# Patient Record
Sex: FEMALE | Race: BLACK OR AFRICAN AMERICAN | NOT HISPANIC OR LATINO
[De-identification: names, ages, dates, MRNs, and addresses within clinical notes are randomized per-mention and may not be internally consistent; named-entity substitution may affect disease eponyms.]

---

## 2017-02-21 ENCOUNTER — APPOINTMENT (OUTPATIENT)
Dept: SPINE | Facility: CLINIC | Age: 62
End: 2017-02-21

## 2017-03-21 ENCOUNTER — APPOINTMENT (OUTPATIENT)
Dept: SPINE | Facility: CLINIC | Age: 62
End: 2017-03-21

## 2017-03-21 VITALS
DIASTOLIC BLOOD PRESSURE: 84 MMHG | WEIGHT: 198 LBS | HEIGHT: 62.5 IN | BODY MASS INDEX: 35.52 KG/M2 | SYSTOLIC BLOOD PRESSURE: 126 MMHG

## 2017-03-21 DIAGNOSIS — Z80.9 FAMILY HISTORY OF MALIGNANT NEOPLASM, UNSPECIFIED: ICD-10-CM

## 2017-03-21 DIAGNOSIS — Z86.39 PERSONAL HISTORY OF OTHER ENDOCRINE, NUTRITIONAL AND METABOLIC DISEASE: ICD-10-CM

## 2017-03-21 DIAGNOSIS — Z80.1 FAMILY HISTORY OF MALIGNANT NEOPLASM OF TRACHEA, BRONCHUS AND LUNG: ICD-10-CM

## 2017-03-21 DIAGNOSIS — Z86.79 PERSONAL HISTORY OF OTHER DISEASES OF THE CIRCULATORY SYSTEM: ICD-10-CM

## 2017-03-21 DIAGNOSIS — Z85.9 PERSONAL HISTORY OF MALIGNANT NEOPLASM, UNSPECIFIED: ICD-10-CM

## 2017-03-21 RX ORDER — METOPROLOL SUCCINATE 50 MG/1
50 TABLET, EXTENDED RELEASE ORAL
Refills: 0 | Status: ACTIVE | COMMUNITY

## 2017-03-21 RX ORDER — METFORMIN HYDROCHLORIDE 625 MG/1
TABLET ORAL
Refills: 0 | Status: ACTIVE | COMMUNITY

## 2017-03-21 RX ORDER — MULTIVITAMIN
TABLET ORAL
Refills: 0 | Status: ACTIVE | COMMUNITY

## 2017-03-21 RX ORDER — ASPIRIN 81 MG
81 TABLET, DELAYED RELEASE (ENTERIC COATED) ORAL
Refills: 0 | Status: ACTIVE | COMMUNITY

## 2017-03-21 RX ORDER — AMLODIPINE BESYLATE 10 MG/1
10 TABLET ORAL
Refills: 0 | Status: ACTIVE | COMMUNITY

## 2017-08-31 ENCOUNTER — APPOINTMENT (OUTPATIENT)
Dept: SPINE | Facility: CLINIC | Age: 62
End: 2017-08-31
Payer: COMMERCIAL

## 2017-08-31 VITALS
HEIGHT: 62 IN | WEIGHT: 196 LBS | BODY MASS INDEX: 36.07 KG/M2 | DIASTOLIC BLOOD PRESSURE: 78 MMHG | SYSTOLIC BLOOD PRESSURE: 124 MMHG

## 2017-08-31 DIAGNOSIS — M48.06 SPINAL STENOSIS, LUMBAR REGION: ICD-10-CM

## 2017-08-31 PROCEDURE — 99213 OFFICE O/P EST LOW 20 MIN: CPT

## 2017-08-31 RX ORDER — FLUTICASONE PROPIONATE AND SALMETEROL 50; 250 UG/1; UG/1
250-50 POWDER RESPIRATORY (INHALATION)
Refills: 0 | Status: ACTIVE | COMMUNITY

## 2017-08-31 RX ORDER — ALBUTEROL SULFATE 90 UG/1
AEROSOL, METERED RESPIRATORY (INHALATION)
Refills: 0 | Status: ACTIVE | COMMUNITY

## 2017-10-06 ENCOUNTER — OUTPATIENT (OUTPATIENT)
Dept: OUTPATIENT SERVICES | Facility: HOSPITAL | Age: 62
LOS: 1 days | End: 2017-10-06
Payer: COMMERCIAL

## 2017-10-06 VITALS
SYSTOLIC BLOOD PRESSURE: 129 MMHG | WEIGHT: 192.02 LBS | HEART RATE: 59 BPM | RESPIRATION RATE: 18 BRPM | HEIGHT: 62 IN | OXYGEN SATURATION: 100 % | TEMPERATURE: 98 F | DIASTOLIC BLOOD PRESSURE: 72 MMHG

## 2017-10-06 DIAGNOSIS — E11.9 TYPE 2 DIABETES MELLITUS WITHOUT COMPLICATIONS: ICD-10-CM

## 2017-10-06 DIAGNOSIS — I25.10 ATHEROSCLEROTIC HEART DISEASE OF NATIVE CORONARY ARTERY WITHOUT ANGINA PECTORIS: ICD-10-CM

## 2017-10-06 DIAGNOSIS — Z01.818 ENCOUNTER FOR OTHER PREPROCEDURAL EXAMINATION: ICD-10-CM

## 2017-10-06 DIAGNOSIS — Z98.890 OTHER SPECIFIED POSTPROCEDURAL STATES: Chronic | ICD-10-CM

## 2017-10-06 DIAGNOSIS — J45.30 MILD PERSISTENT ASTHMA, UNCOMPLICATED: ICD-10-CM

## 2017-10-06 DIAGNOSIS — Z86.79 PERSONAL HISTORY OF OTHER DISEASES OF THE CIRCULATORY SYSTEM: ICD-10-CM

## 2017-10-06 DIAGNOSIS — M48.00 SPINAL STENOSIS, SITE UNSPECIFIED: ICD-10-CM

## 2017-10-06 DIAGNOSIS — M48.061 SPINAL STENOSIS, LUMBAR REGION WITHOUT NEUROGENIC CLAUDICATION: ICD-10-CM

## 2017-10-06 DIAGNOSIS — Z90.49 ACQUIRED ABSENCE OF OTHER SPECIFIED PARTS OF DIGESTIVE TRACT: Chronic | ICD-10-CM

## 2017-10-06 LAB
ANION GAP SERPL CALC-SCNC: 15 MMOL/L — SIGNIFICANT CHANGE UP (ref 5–17)
BUN SERPL-MCNC: 14 MG/DL — SIGNIFICANT CHANGE UP (ref 7–23)
CALCIUM SERPL-MCNC: 9.5 MG/DL — SIGNIFICANT CHANGE UP (ref 8.4–10.5)
CHLORIDE SERPL-SCNC: 103 MMOL/L — SIGNIFICANT CHANGE UP (ref 96–108)
CO2 SERPL-SCNC: 22 MMOL/L — SIGNIFICANT CHANGE UP (ref 22–31)
CREAT SERPL-MCNC: 0.66 MG/DL — SIGNIFICANT CHANGE UP (ref 0.5–1.3)
GLUCOSE SERPL-MCNC: 178 MG/DL — HIGH (ref 70–99)
HBA1C BLD-MCNC: 10.4 % — HIGH (ref 4–5.6)
HCT VFR BLD CALC: 39.9 % — SIGNIFICANT CHANGE UP (ref 34.5–45)
HGB BLD-MCNC: 13.6 G/DL — SIGNIFICANT CHANGE UP (ref 11.5–15.5)
MCHC RBC-ENTMCNC: 29.8 PG — SIGNIFICANT CHANGE UP (ref 27–34)
MCHC RBC-ENTMCNC: 34.1 GM/DL — SIGNIFICANT CHANGE UP (ref 32–36)
MCV RBC AUTO: 87.5 FL — SIGNIFICANT CHANGE UP (ref 80–100)
PLATELET # BLD AUTO: 317 K/UL — SIGNIFICANT CHANGE UP (ref 150–400)
POTASSIUM SERPL-MCNC: 4.4 MMOL/L — SIGNIFICANT CHANGE UP (ref 3.5–5.3)
POTASSIUM SERPL-SCNC: 4.4 MMOL/L — SIGNIFICANT CHANGE UP (ref 3.5–5.3)
RBC # BLD: 4.56 M/UL — SIGNIFICANT CHANGE UP (ref 3.8–5.2)
RBC # FLD: 12.3 % — SIGNIFICANT CHANGE UP (ref 10.3–14.5)
SODIUM SERPL-SCNC: 140 MMOL/L — SIGNIFICANT CHANGE UP (ref 135–145)
WBC # BLD: 7.47 K/UL — SIGNIFICANT CHANGE UP (ref 3.8–10.5)
WBC # FLD AUTO: 7.47 K/UL — SIGNIFICANT CHANGE UP (ref 3.8–10.5)

## 2017-10-06 PROCEDURE — 83036 HEMOGLOBIN GLYCOSYLATED A1C: CPT

## 2017-10-06 PROCEDURE — 93005 ELECTROCARDIOGRAM TRACING: CPT

## 2017-10-06 PROCEDURE — 80048 BASIC METABOLIC PNL TOTAL CA: CPT

## 2017-10-06 PROCEDURE — 93010 ELECTROCARDIOGRAM REPORT: CPT

## 2017-10-06 PROCEDURE — 85027 COMPLETE CBC AUTOMATED: CPT

## 2017-10-06 PROCEDURE — G0463: CPT

## 2017-10-06 NOTE — H&P PST ADULT - PROBLEM SELECTOR PLAN 2
will continue antihypertensive medication imelda-op  last echo 4/2017 to be requested  scheduled to be evaluated by her PCP prior to surgery

## 2017-10-06 NOTE — H&P PST ADULT - HISTORY OF PRESENT ILLNESS
61 year old female with h/o previous MI s/p PCI with stenting (2000), HTN, T2DM, Dyslipidemia, mild intermittent asthma, iron deficiency anemia, scheduled for L4-S1 decompressive lumbar laminectomy for spinal stenosis.

## 2017-10-13 ENCOUNTER — APPOINTMENT (OUTPATIENT)
Dept: SPINE | Facility: HOSPITAL | Age: 62
End: 2017-10-13

## 2017-11-10 ENCOUNTER — OUTPATIENT (OUTPATIENT)
Dept: OUTPATIENT SERVICES | Facility: HOSPITAL | Age: 62
LOS: 1 days | End: 2017-11-10
Payer: COMMERCIAL

## 2017-11-10 VITALS
TEMPERATURE: 98 F | DIASTOLIC BLOOD PRESSURE: 84 MMHG | OXYGEN SATURATION: 97 % | HEIGHT: 62.5 IN | WEIGHT: 194.01 LBS | HEART RATE: 58 BPM | SYSTOLIC BLOOD PRESSURE: 146 MMHG | RESPIRATION RATE: 16 BRPM

## 2017-11-10 DIAGNOSIS — J45.30 MILD PERSISTENT ASTHMA, UNCOMPLICATED: ICD-10-CM

## 2017-11-10 DIAGNOSIS — Z90.49 ACQUIRED ABSENCE OF OTHER SPECIFIED PARTS OF DIGESTIVE TRACT: Chronic | ICD-10-CM

## 2017-11-10 DIAGNOSIS — I25.10 ATHEROSCLEROTIC HEART DISEASE OF NATIVE CORONARY ARTERY WITHOUT ANGINA PECTORIS: ICD-10-CM

## 2017-11-10 DIAGNOSIS — M48.061 SPINAL STENOSIS, LUMBAR REGION WITHOUT NEUROGENIC CLAUDICATION: ICD-10-CM

## 2017-11-10 DIAGNOSIS — E78.5 HYPERLIPIDEMIA, UNSPECIFIED: ICD-10-CM

## 2017-11-10 DIAGNOSIS — E11.9 TYPE 2 DIABETES MELLITUS WITHOUT COMPLICATIONS: ICD-10-CM

## 2017-11-10 DIAGNOSIS — Z98.890 OTHER SPECIFIED POSTPROCEDURAL STATES: Chronic | ICD-10-CM

## 2017-11-10 DIAGNOSIS — M48.00 SPINAL STENOSIS, SITE UNSPECIFIED: ICD-10-CM

## 2017-11-10 DIAGNOSIS — Z01.818 ENCOUNTER FOR OTHER PREPROCEDURAL EXAMINATION: ICD-10-CM

## 2017-11-10 DIAGNOSIS — Z86.79 PERSONAL HISTORY OF OTHER DISEASES OF THE CIRCULATORY SYSTEM: ICD-10-CM

## 2017-11-10 LAB
BLD GP AB SCN SERPL QL: NEGATIVE — SIGNIFICANT CHANGE UP
HBA1C BLD-MCNC: 8.1 % — HIGH (ref 4–5.6)
RH IG SCN BLD-IMP: POSITIVE — SIGNIFICANT CHANGE UP

## 2017-11-10 PROCEDURE — 86900 BLOOD TYPING SEROLOGIC ABO: CPT

## 2017-11-10 PROCEDURE — 83036 HEMOGLOBIN GLYCOSYLATED A1C: CPT

## 2017-11-10 PROCEDURE — G0463: CPT

## 2017-11-10 PROCEDURE — 86901 BLOOD TYPING SEROLOGIC RH(D): CPT

## 2017-11-10 PROCEDURE — 86850 RBC ANTIBODY SCREEN: CPT

## 2017-11-10 NOTE — H&P PST ADULT - HISTORY OF PRESENT ILLNESS
61 year old female with h/o previous MI s/p PCI with stenting (2000), HTN, T2DM, Dyslipidemia, mild intermittent asthma, iron deficiency anemia, scheduled for L4-S1 decompressive lumbar laminectomy for spinal stenosis. 61 year old female with h/o previous MI s/p PCI with stenting (2000), HTN, T2DM, Dyslipidemia, mild intermittent asthma ( last exacerbation May 2017) , iron deficiency anemia, scheduled for L4-S1 decompressive lumbar laminectomy for spinal stenosis.  Surgery was cancelled because pt needed a stress test; Pt has had stress test done and went to see PCP 11/7/2017

## 2017-11-17 ENCOUNTER — TRANSCRIPTION ENCOUNTER (OUTPATIENT)
Age: 62
End: 2017-11-17

## 2017-11-17 ENCOUNTER — APPOINTMENT (OUTPATIENT)
Dept: SPINE | Facility: HOSPITAL | Age: 62
End: 2017-11-17

## 2017-11-17 ENCOUNTER — INPATIENT (INPATIENT)
Facility: HOSPITAL | Age: 62
LOS: 3 days | Discharge: HOME | DRG: 516 | End: 2017-11-21
Attending: NEUROLOGICAL SURGERY | Admitting: NEUROLOGICAL SURGERY
Payer: COMMERCIAL

## 2017-11-17 VITALS
RESPIRATION RATE: 17 BRPM | DIASTOLIC BLOOD PRESSURE: 81 MMHG | HEIGHT: 62.5 IN | TEMPERATURE: 98 F | HEART RATE: 66 BPM | SYSTOLIC BLOOD PRESSURE: 146 MMHG | OXYGEN SATURATION: 98 % | WEIGHT: 194.01 LBS

## 2017-11-17 DIAGNOSIS — Z98.890 OTHER SPECIFIED POSTPROCEDURAL STATES: Chronic | ICD-10-CM

## 2017-11-17 DIAGNOSIS — Z90.49 ACQUIRED ABSENCE OF OTHER SPECIFIED PARTS OF DIGESTIVE TRACT: Chronic | ICD-10-CM

## 2017-11-17 DIAGNOSIS — M48.061 SPINAL STENOSIS, LUMBAR REGION WITHOUT NEUROGENIC CLAUDICATION: ICD-10-CM

## 2017-11-17 LAB
ANION GAP SERPL CALC-SCNC: 14 MMOL/L — SIGNIFICANT CHANGE UP (ref 5–17)
BASOPHILS # BLD AUTO: 0 K/UL — SIGNIFICANT CHANGE UP (ref 0–0.2)
BASOPHILS NFR BLD AUTO: 0 % — SIGNIFICANT CHANGE UP (ref 0–2)
BUN SERPL-MCNC: 11 MG/DL — SIGNIFICANT CHANGE UP (ref 7–23)
CALCIUM SERPL-MCNC: 7.9 MG/DL — LOW (ref 8.4–10.5)
CHLORIDE SERPL-SCNC: 106 MMOL/L — SIGNIFICANT CHANGE UP (ref 96–108)
CO2 SERPL-SCNC: 22 MMOL/L — SIGNIFICANT CHANGE UP (ref 22–31)
CREAT SERPL-MCNC: 0.53 MG/DL — SIGNIFICANT CHANGE UP (ref 0.5–1.3)
EOSINOPHIL # BLD AUTO: 0 K/UL — SIGNIFICANT CHANGE UP (ref 0–0.5)
EOSINOPHIL NFR BLD AUTO: 0.3 % — SIGNIFICANT CHANGE UP (ref 0–6)
GLUCOSE BLDC GLUCOMTR-MCNC: 205 MG/DL — HIGH (ref 70–99)
GLUCOSE BLDC GLUCOMTR-MCNC: 215 MG/DL — HIGH (ref 70–99)
GLUCOSE BLDC GLUCOMTR-MCNC: 85 MG/DL — SIGNIFICANT CHANGE UP (ref 70–99)
GLUCOSE SERPL-MCNC: 231 MG/DL — HIGH (ref 70–99)
HCT VFR BLD CALC: 40 % — SIGNIFICANT CHANGE UP (ref 34.5–45)
HGB BLD-MCNC: 14.2 G/DL — SIGNIFICANT CHANGE UP (ref 11.5–15.5)
LYMPHOCYTES # BLD AUTO: 1.2 K/UL — SIGNIFICANT CHANGE UP (ref 1–3.3)
LYMPHOCYTES # BLD AUTO: 14.1 % — SIGNIFICANT CHANGE UP (ref 13–44)
MCHC RBC-ENTMCNC: 32 PG — SIGNIFICANT CHANGE UP (ref 27–34)
MCHC RBC-ENTMCNC: 35.5 GM/DL — SIGNIFICANT CHANGE UP (ref 32–36)
MCV RBC AUTO: 90.3 FL — SIGNIFICANT CHANGE UP (ref 80–100)
MONOCYTES # BLD AUTO: 0.2 K/UL — SIGNIFICANT CHANGE UP (ref 0–0.9)
MONOCYTES NFR BLD AUTO: 2.1 % — SIGNIFICANT CHANGE UP (ref 2–14)
NEUTROPHILS # BLD AUTO: 6.9 K/UL — SIGNIFICANT CHANGE UP (ref 1.8–7.4)
NEUTROPHILS NFR BLD AUTO: 83.5 % — HIGH (ref 43–77)
PLATELET # BLD AUTO: 292 K/UL — SIGNIFICANT CHANGE UP (ref 150–400)
POTASSIUM SERPL-MCNC: 3.5 MMOL/L — SIGNIFICANT CHANGE UP (ref 3.5–5.3)
POTASSIUM SERPL-SCNC: 3.5 MMOL/L — SIGNIFICANT CHANGE UP (ref 3.5–5.3)
RBC # BLD: 4.44 M/UL — SIGNIFICANT CHANGE UP (ref 3.8–5.2)
RBC # FLD: 11.9 % — SIGNIFICANT CHANGE UP (ref 10.3–14.5)
RH IG SCN BLD-IMP: POSITIVE — SIGNIFICANT CHANGE UP
SODIUM SERPL-SCNC: 142 MMOL/L — SIGNIFICANT CHANGE UP (ref 135–145)
WBC # BLD: 8.3 K/UL — SIGNIFICANT CHANGE UP (ref 3.8–10.5)
WBC # FLD AUTO: 8.3 K/UL — SIGNIFICANT CHANGE UP (ref 3.8–10.5)

## 2017-11-17 RX ORDER — ASPIRIN/CALCIUM CARB/MAGNESIUM 324 MG
81 TABLET ORAL DAILY
Qty: 0 | Refills: 0 | Status: DISCONTINUED | OUTPATIENT
Start: 2017-11-18 | End: 2017-11-21

## 2017-11-17 RX ORDER — ROSUVASTATIN CALCIUM 5 MG/1
1 TABLET ORAL
Qty: 0 | Refills: 0 | COMMUNITY

## 2017-11-17 RX ORDER — OXYCODONE HYDROCHLORIDE 5 MG/1
5 TABLET ORAL EVERY 4 HOURS
Qty: 0 | Refills: 0 | Status: DISCONTINUED | OUTPATIENT
Start: 2017-11-17 | End: 2017-11-18

## 2017-11-17 RX ORDER — MONTELUKAST 4 MG/1
10 TABLET, CHEWABLE ORAL ONCE
Qty: 0 | Refills: 0 | Status: COMPLETED | OUTPATIENT
Start: 2017-11-17 | End: 2017-11-17

## 2017-11-17 RX ORDER — ASPIRIN/CALCIUM CARB/MAGNESIUM 324 MG
1 TABLET ORAL
Qty: 0 | Refills: 0 | COMMUNITY

## 2017-11-17 RX ORDER — MAGNESIUM HYDROXIDE 400 MG/1
30 TABLET, CHEWABLE ORAL EVERY 12 HOURS
Qty: 0 | Refills: 0 | Status: DISCONTINUED | OUTPATIENT
Start: 2017-11-17 | End: 2017-11-20

## 2017-11-17 RX ORDER — INSULIN LISPRO 100/ML
VIAL (ML) SUBCUTANEOUS
Qty: 0 | Refills: 0 | Status: DISCONTINUED | OUTPATIENT
Start: 2017-11-17 | End: 2017-11-21

## 2017-11-17 RX ORDER — AMLODIPINE BESYLATE 2.5 MG/1
10 TABLET ORAL DAILY
Qty: 0 | Refills: 0 | Status: DISCONTINUED | OUTPATIENT
Start: 2017-11-17 | End: 2017-11-21

## 2017-11-17 RX ORDER — DEXTROSE 50 % IN WATER 50 %
25 SYRINGE (ML) INTRAVENOUS ONCE
Qty: 0 | Refills: 0 | Status: DISCONTINUED | OUTPATIENT
Start: 2017-11-17 | End: 2017-11-21

## 2017-11-17 RX ORDER — SODIUM CHLORIDE 9 MG/ML
1000 INJECTION, SOLUTION INTRAVENOUS
Qty: 0 | Refills: 0 | Status: DISCONTINUED | OUTPATIENT
Start: 2017-11-17 | End: 2017-11-21

## 2017-11-17 RX ORDER — AMLODIPINE BESYLATE 2.5 MG/1
1 TABLET ORAL
Qty: 0 | Refills: 0 | COMMUNITY

## 2017-11-17 RX ORDER — DOCUSATE SODIUM 100 MG
100 CAPSULE ORAL THREE TIMES A DAY
Qty: 0 | Refills: 0 | Status: DISCONTINUED | OUTPATIENT
Start: 2017-11-17 | End: 2017-11-21

## 2017-11-17 RX ORDER — METFORMIN HYDROCHLORIDE 850 MG/1
1 TABLET ORAL
Qty: 0 | Refills: 0 | COMMUNITY

## 2017-11-17 RX ORDER — INSULIN LISPRO 100/ML
VIAL (ML) SUBCUTANEOUS AT BEDTIME
Qty: 0 | Refills: 0 | Status: DISCONTINUED | OUTPATIENT
Start: 2017-11-17 | End: 2017-11-21

## 2017-11-17 RX ORDER — LIDOCAINE HCL 20 MG/ML
0.2 VIAL (ML) INJECTION ONCE
Qty: 0 | Refills: 0 | Status: DISCONTINUED | OUTPATIENT
Start: 2017-11-17 | End: 2017-11-17

## 2017-11-17 RX ORDER — MONTELUKAST 4 MG/1
10 TABLET, CHEWABLE ORAL DAILY
Qty: 0 | Refills: 0 | Status: DISCONTINUED | OUTPATIENT
Start: 2017-11-18 | End: 2017-11-21

## 2017-11-17 RX ORDER — HYDROMORPHONE HYDROCHLORIDE 2 MG/ML
0.5 INJECTION INTRAMUSCULAR; INTRAVENOUS; SUBCUTANEOUS
Qty: 0 | Refills: 0 | Status: DISCONTINUED | OUTPATIENT
Start: 2017-11-17 | End: 2017-11-17

## 2017-11-17 RX ORDER — IPRATROPIUM/ALBUTEROL SULFATE 18-103MCG
3 AEROSOL WITH ADAPTER (GRAM) INHALATION ONCE
Qty: 0 | Refills: 0 | Status: COMPLETED | OUTPATIENT
Start: 2017-11-17 | End: 2017-11-18

## 2017-11-17 RX ORDER — DEXTROSE 50 % IN WATER 50 %
1 SYRINGE (ML) INTRAVENOUS ONCE
Qty: 0 | Refills: 0 | Status: DISCONTINUED | OUTPATIENT
Start: 2017-11-17 | End: 2017-11-21

## 2017-11-17 RX ORDER — OXYCODONE HYDROCHLORIDE 5 MG/1
5 TABLET ORAL EVERY 4 HOURS
Qty: 0 | Refills: 0 | Status: DISCONTINUED | OUTPATIENT
Start: 2017-11-18 | End: 2017-11-20

## 2017-11-17 RX ORDER — SENNA PLUS 8.6 MG/1
2 TABLET ORAL AT BEDTIME
Qty: 0 | Refills: 0 | Status: DISCONTINUED | OUTPATIENT
Start: 2017-11-17 | End: 2017-11-21

## 2017-11-17 RX ORDER — SODIUM CHLORIDE 9 MG/ML
3 INJECTION INTRAMUSCULAR; INTRAVENOUS; SUBCUTANEOUS EVERY 8 HOURS
Qty: 0 | Refills: 0 | Status: DISCONTINUED | OUTPATIENT
Start: 2017-11-17 | End: 2017-11-17

## 2017-11-17 RX ORDER — METOPROLOL TARTRATE 50 MG
1 TABLET ORAL
Qty: 0 | Refills: 0 | COMMUNITY

## 2017-11-17 RX ORDER — TIOTROPIUM BROMIDE 18 UG/1
2 CAPSULE ORAL; RESPIRATORY (INHALATION)
Qty: 0 | Refills: 0 | COMMUNITY

## 2017-11-17 RX ORDER — DIPHENHYDRAMINE HCL 50 MG
50 CAPSULE ORAL EVERY 4 HOURS
Qty: 0 | Refills: 0 | Status: COMPLETED | OUTPATIENT
Start: 2017-11-17 | End: 2017-11-18

## 2017-11-17 RX ORDER — ATORVASTATIN CALCIUM 80 MG/1
20 TABLET, FILM COATED ORAL AT BEDTIME
Qty: 0 | Refills: 0 | Status: DISCONTINUED | OUTPATIENT
Start: 2017-11-17 | End: 2017-11-21

## 2017-11-17 RX ORDER — GLUCAGON INJECTION, SOLUTION 0.5 MG/.1ML
1 INJECTION, SOLUTION SUBCUTANEOUS ONCE
Qty: 0 | Refills: 0 | Status: DISCONTINUED | OUTPATIENT
Start: 2017-11-17 | End: 2017-11-21

## 2017-11-17 RX ORDER — HYDROMORPHONE HYDROCHLORIDE 2 MG/ML
0.5 INJECTION INTRAMUSCULAR; INTRAVENOUS; SUBCUTANEOUS EVERY 6 HOURS
Qty: 0 | Refills: 0 | Status: DISCONTINUED | OUTPATIENT
Start: 2017-11-17 | End: 2017-11-19

## 2017-11-17 RX ORDER — DEXTROSE 50 % IN WATER 50 %
12.5 SYRINGE (ML) INTRAVENOUS ONCE
Qty: 0 | Refills: 0 | Status: DISCONTINUED | OUTPATIENT
Start: 2017-11-17 | End: 2017-11-21

## 2017-11-17 RX ORDER — DEXTROSE MONOHYDRATE, SODIUM CHLORIDE, AND POTASSIUM CHLORIDE 50; .745; 4.5 G/1000ML; G/1000ML; G/1000ML
1000 INJECTION, SOLUTION INTRAVENOUS
Qty: 0 | Refills: 0 | Status: DISCONTINUED | OUTPATIENT
Start: 2017-11-17 | End: 2017-11-18

## 2017-11-17 RX ORDER — ONDANSETRON 8 MG/1
4 TABLET, FILM COATED ORAL EVERY 6 HOURS
Qty: 0 | Refills: 0 | Status: DISCONTINUED | OUTPATIENT
Start: 2017-11-17 | End: 2017-11-21

## 2017-11-17 RX ORDER — METOPROLOL TARTRATE 50 MG
50 TABLET ORAL
Qty: 0 | Refills: 0 | Status: DISCONTINUED | OUTPATIENT
Start: 2017-11-17 | End: 2017-11-21

## 2017-11-17 RX ORDER — DEXAMETHASONE 0.5 MG/5ML
4 ELIXIR ORAL EVERY 6 HOURS
Qty: 0 | Refills: 0 | Status: COMPLETED | OUTPATIENT
Start: 2017-11-17 | End: 2017-11-18

## 2017-11-17 RX ORDER — LISINOPRIL 2.5 MG/1
2.5 TABLET ORAL DAILY
Qty: 0 | Refills: 0 | Status: DISCONTINUED | OUTPATIENT
Start: 2017-11-17 | End: 2017-11-21

## 2017-11-17 RX ORDER — LISINOPRIL 2.5 MG/1
1 TABLET ORAL
Qty: 0 | Refills: 0 | COMMUNITY

## 2017-11-17 RX ORDER — ONDANSETRON 8 MG/1
4 TABLET, FILM COATED ORAL ONCE
Qty: 0 | Refills: 0 | Status: DISCONTINUED | OUTPATIENT
Start: 2017-11-17 | End: 2017-11-17

## 2017-11-17 RX ORDER — TIOTROPIUM BROMIDE 18 UG/1
1 CAPSULE ORAL; RESPIRATORY (INHALATION) DAILY
Qty: 0 | Refills: 0 | Status: DISCONTINUED | OUTPATIENT
Start: 2017-11-17 | End: 2017-11-21

## 2017-11-17 RX ORDER — ACETAMINOPHEN 500 MG
1000 TABLET ORAL ONCE
Qty: 0 | Refills: 0 | Status: COMPLETED | OUTPATIENT
Start: 2017-11-17 | End: 2017-11-17

## 2017-11-17 RX ADMIN — Medication 4 MILLIGRAM(S): at 23:57

## 2017-11-17 RX ADMIN — Medication 2: at 17:41

## 2017-11-17 RX ADMIN — HYDROMORPHONE HYDROCHLORIDE 0.5 MILLIGRAM(S): 2 INJECTION INTRAMUSCULAR; INTRAVENOUS; SUBCUTANEOUS at 15:00

## 2017-11-17 RX ADMIN — Medication 100 MILLIGRAM(S): at 23:56

## 2017-11-17 RX ADMIN — HYDROMORPHONE HYDROCHLORIDE 0.5 MILLIGRAM(S): 2 INJECTION INTRAMUSCULAR; INTRAVENOUS; SUBCUTANEOUS at 15:15

## 2017-11-17 RX ADMIN — OXYCODONE HYDROCHLORIDE 5 MILLIGRAM(S): 5 TABLET ORAL at 19:00

## 2017-11-17 RX ADMIN — Medication 50 MILLIGRAM(S): at 19:00

## 2017-11-17 RX ADMIN — Medication 400 MILLIGRAM(S): at 12:13

## 2017-11-17 RX ADMIN — ATORVASTATIN CALCIUM 20 MILLIGRAM(S): 80 TABLET, FILM COATED ORAL at 23:56

## 2017-11-17 RX ADMIN — DEXTROSE MONOHYDRATE, SODIUM CHLORIDE, AND POTASSIUM CHLORIDE 110 MILLILITER(S): 50; .745; 4.5 INJECTION, SOLUTION INTRAVENOUS at 14:06

## 2017-11-17 RX ADMIN — Medication 1000 MILLIGRAM(S): at 12:45

## 2017-11-17 RX ADMIN — Medication 50 MILLIGRAM(S): at 19:57

## 2017-11-17 RX ADMIN — Medication 50 MILLIGRAM(S): at 15:45

## 2017-11-17 RX ADMIN — HYDROMORPHONE HYDROCHLORIDE 0.5 MILLIGRAM(S): 2 INJECTION INTRAMUSCULAR; INTRAVENOUS; SUBCUTANEOUS at 19:57

## 2017-11-17 RX ADMIN — OXYCODONE HYDROCHLORIDE 5 MILLIGRAM(S): 5 TABLET ORAL at 23:56

## 2017-11-17 RX ADMIN — SENNA PLUS 2 TABLET(S): 8.6 TABLET ORAL at 23:56

## 2017-11-17 RX ADMIN — Medication 4 MILLIGRAM(S): at 16:15

## 2017-11-17 RX ADMIN — Medication 50 MILLIGRAM(S): at 23:56

## 2017-11-17 NOTE — PHYSICAL THERAPY INITIAL EVALUATION ADULT - DIAGNOSIS, PT EVAL
Pt is a 60 yo F who pw low back pain and lumbar spinal stenosis. Now s/p L4-5, L5-S1 decompressive laminectomy, medial facetectomy and foraminotomy. on 11/17

## 2017-11-17 NOTE — PHYSICAL THERAPY INITIAL EVALUATION ADULT - ADDITIONAL COMMENTS
Pt lives with her spouse in an apartment building +elevator access. Pt reports she was independent with all ADL's and ambulation without an AD.

## 2017-11-18 LAB
GLUCOSE BLDC GLUCOMTR-MCNC: 200 MG/DL — HIGH (ref 70–99)
GLUCOSE BLDC GLUCOMTR-MCNC: 202 MG/DL — HIGH (ref 70–99)
GLUCOSE BLDC GLUCOMTR-MCNC: 253 MG/DL — HIGH (ref 70–99)
GLUCOSE BLDC GLUCOMTR-MCNC: 266 MG/DL — HIGH (ref 70–99)

## 2017-11-18 RX ADMIN — Medication 3: at 12:43

## 2017-11-18 RX ADMIN — Medication 2: at 08:21

## 2017-11-18 RX ADMIN — OXYCODONE HYDROCHLORIDE 5 MILLIGRAM(S): 5 TABLET ORAL at 16:41

## 2017-11-18 RX ADMIN — AMLODIPINE BESYLATE 10 MILLIGRAM(S): 2.5 TABLET ORAL at 06:49

## 2017-11-18 RX ADMIN — Medication 1: at 21:15

## 2017-11-18 RX ADMIN — OXYCODONE HYDROCHLORIDE 5 MILLIGRAM(S): 5 TABLET ORAL at 07:22

## 2017-11-18 RX ADMIN — OXYCODONE HYDROCHLORIDE 5 MILLIGRAM(S): 5 TABLET ORAL at 16:11

## 2017-11-18 RX ADMIN — SENNA PLUS 2 TABLET(S): 8.6 TABLET ORAL at 21:14

## 2017-11-18 RX ADMIN — LISINOPRIL 2.5 MILLIGRAM(S): 2.5 TABLET ORAL at 06:49

## 2017-11-18 RX ADMIN — Medication 4 MILLIGRAM(S): at 04:13

## 2017-11-18 RX ADMIN — OXYCODONE HYDROCHLORIDE 5 MILLIGRAM(S): 5 TABLET ORAL at 09:28

## 2017-11-18 RX ADMIN — Medication 50 MILLIGRAM(S): at 04:13

## 2017-11-18 RX ADMIN — OXYCODONE HYDROCHLORIDE 5 MILLIGRAM(S): 5 TABLET ORAL at 06:49

## 2017-11-18 RX ADMIN — ATORVASTATIN CALCIUM 20 MILLIGRAM(S): 80 TABLET, FILM COATED ORAL at 21:14

## 2017-11-18 RX ADMIN — Medication 4 MILLIGRAM(S): at 09:26

## 2017-11-18 RX ADMIN — Medication 3 MILLILITER(S): at 18:37

## 2017-11-18 RX ADMIN — Medication 100 MILLIGRAM(S): at 21:14

## 2017-11-18 RX ADMIN — OXYCODONE HYDROCHLORIDE 5 MILLIGRAM(S): 5 TABLET ORAL at 21:14

## 2017-11-18 RX ADMIN — Medication 1: at 18:34

## 2017-11-18 RX ADMIN — OXYCODONE HYDROCHLORIDE 5 MILLIGRAM(S): 5 TABLET ORAL at 09:58

## 2017-11-18 RX ADMIN — Medication 50 MILLIGRAM(S): at 06:49

## 2017-11-18 RX ADMIN — TIOTROPIUM BROMIDE 1 CAPSULE(S): 18 CAPSULE ORAL; RESPIRATORY (INHALATION) at 11:18

## 2017-11-18 RX ADMIN — Medication 100 MILLIGRAM(S): at 14:02

## 2017-11-18 RX ADMIN — MONTELUKAST 10 MILLIGRAM(S): 4 TABLET, CHEWABLE ORAL at 11:18

## 2017-11-18 RX ADMIN — Medication 81 MILLIGRAM(S): at 11:18

## 2017-11-18 RX ADMIN — Medication 100 MILLIGRAM(S): at 06:49

## 2017-11-18 NOTE — PROGRESS NOTE ADULT - ASSESSMENT
61F with h/o previous MI s/p PCI with stenting (2000), HTN, T2DM, Dyslipidemia, mild intermittent asthma ( last exacerbation May 2017) , iron deficiency anemia, s/p L4-S1 decompressive lumbar laminectomy for spinal stenosis.    -Pain control - continue oxy IR, dilaudid, tylenol  -Neurochecks q4h  -Physical therapy- TBD  -Continue HMV drain  -OOB as tolerated  -DC IVF  -ASA81 today  -Will need allergy/immunology follow-up. Received decadron 4q6 x 4 doses for post-operative rash.

## 2017-11-19 LAB
GLUCOSE BLDC GLUCOMTR-MCNC: 130 MG/DL — HIGH (ref 70–99)
GLUCOSE BLDC GLUCOMTR-MCNC: 131 MG/DL — HIGH (ref 70–99)
GLUCOSE BLDC GLUCOMTR-MCNC: 166 MG/DL — HIGH (ref 70–99)
GLUCOSE BLDC GLUCOMTR-MCNC: 169 MG/DL — HIGH (ref 70–99)

## 2017-11-19 RX ORDER — ENOXAPARIN SODIUM 100 MG/ML
40 INJECTION SUBCUTANEOUS DAILY
Qty: 0 | Refills: 0 | Status: DISCONTINUED | OUTPATIENT
Start: 2017-11-19 | End: 2017-11-21

## 2017-11-19 RX ORDER — HYDROMORPHONE HYDROCHLORIDE 2 MG/ML
1 INJECTION INTRAMUSCULAR; INTRAVENOUS; SUBCUTANEOUS
Qty: 0 | Refills: 0 | Status: DISCONTINUED | OUTPATIENT
Start: 2017-11-19 | End: 2017-11-20

## 2017-11-19 RX ORDER — IPRATROPIUM/ALBUTEROL SULFATE 18-103MCG
3 AEROSOL WITH ADAPTER (GRAM) INHALATION ONCE
Qty: 0 | Refills: 0 | Status: COMPLETED | OUTPATIENT
Start: 2017-11-19 | End: 2017-11-19

## 2017-11-19 RX ORDER — ACETAMINOPHEN 500 MG
1000 TABLET ORAL ONCE
Qty: 0 | Refills: 0 | Status: COMPLETED | OUTPATIENT
Start: 2017-11-19 | End: 2017-11-19

## 2017-11-19 RX ADMIN — SENNA PLUS 2 TABLET(S): 8.6 TABLET ORAL at 21:26

## 2017-11-19 RX ADMIN — Medication 3 MILLILITER(S): at 13:11

## 2017-11-19 RX ADMIN — Medication 100 MILLIGRAM(S): at 13:11

## 2017-11-19 RX ADMIN — OXYCODONE HYDROCHLORIDE 5 MILLIGRAM(S): 5 TABLET ORAL at 21:26

## 2017-11-19 RX ADMIN — OXYCODONE HYDROCHLORIDE 5 MILLIGRAM(S): 5 TABLET ORAL at 10:23

## 2017-11-19 RX ADMIN — ENOXAPARIN SODIUM 40 MILLIGRAM(S): 100 INJECTION SUBCUTANEOUS at 17:11

## 2017-11-19 RX ADMIN — Medication 400 MILLIGRAM(S): at 13:28

## 2017-11-19 RX ADMIN — Medication 50 MILLIGRAM(S): at 06:35

## 2017-11-19 RX ADMIN — TIOTROPIUM BROMIDE 1 CAPSULE(S): 18 CAPSULE ORAL; RESPIRATORY (INHALATION) at 11:03

## 2017-11-19 RX ADMIN — AMLODIPINE BESYLATE 10 MILLIGRAM(S): 2.5 TABLET ORAL at 06:35

## 2017-11-19 RX ADMIN — LISINOPRIL 2.5 MILLIGRAM(S): 2.5 TABLET ORAL at 06:35

## 2017-11-19 RX ADMIN — MONTELUKAST 10 MILLIGRAM(S): 4 TABLET, CHEWABLE ORAL at 11:03

## 2017-11-19 RX ADMIN — Medication 1000 MILLIGRAM(S): at 13:56

## 2017-11-19 RX ADMIN — Medication 100 MILLIGRAM(S): at 21:26

## 2017-11-19 RX ADMIN — ATORVASTATIN CALCIUM 20 MILLIGRAM(S): 80 TABLET, FILM COATED ORAL at 21:26

## 2017-11-19 RX ADMIN — Medication 1: at 17:35

## 2017-11-19 RX ADMIN — OXYCODONE HYDROCHLORIDE 5 MILLIGRAM(S): 5 TABLET ORAL at 02:10

## 2017-11-19 RX ADMIN — OXYCODONE HYDROCHLORIDE 5 MILLIGRAM(S): 5 TABLET ORAL at 09:59

## 2017-11-19 RX ADMIN — OXYCODONE HYDROCHLORIDE 5 MILLIGRAM(S): 5 TABLET ORAL at 23:12

## 2017-11-19 RX ADMIN — Medication 50 MILLIGRAM(S): at 17:11

## 2017-11-19 RX ADMIN — Medication 100 MILLIGRAM(S): at 06:35

## 2017-11-19 RX ADMIN — Medication 81 MILLIGRAM(S): at 11:03

## 2017-11-19 NOTE — PROGRESS NOTE ADULT - ASSESSMENT
61F with h/o previous MI s/p PCI with stenting (2000), HTN, T2DM, Dyslipidemia, mild intermittent asthma ( last exacerbation May 2017) , iron deficiency anemia, s/p L4-S1 decompressive lumbar laminectomy for spinal stenosis.    -Pain control - continue oxy IR, dilaudid, tylenol  -Neurochecks q4h  -Physical therapy- TBD  -Continue HMV drain  -OOB as tolerated  -ASA81 today  -Will need allergy/immunology follow-up. Received decadron 4q6 x 4 doses for post-operative rash.

## 2017-11-20 DIAGNOSIS — M48.062 SPINAL STENOSIS, LUMBAR REGION WITH NEUROGENIC CLAUDICATION: ICD-10-CM

## 2017-11-20 DIAGNOSIS — T78.2XXA ANAPHYLACTIC SHOCK, UNSPECIFIED, INITIAL ENCOUNTER: ICD-10-CM

## 2017-11-20 DIAGNOSIS — Z29.9 ENCOUNTER FOR PROPHYLACTIC MEASURES, UNSPECIFIED: ICD-10-CM

## 2017-11-20 DIAGNOSIS — J45.30 MILD PERSISTENT ASTHMA, UNCOMPLICATED: ICD-10-CM

## 2017-11-20 LAB
ALBUMIN SERPL ELPH-MCNC: 3.9 G/DL — SIGNIFICANT CHANGE UP (ref 3.3–5)
ALP SERPL-CCNC: 95 U/L — SIGNIFICANT CHANGE UP (ref 40–120)
ALT FLD-CCNC: 41 U/L RC — SIGNIFICANT CHANGE UP (ref 10–45)
ANION GAP SERPL CALC-SCNC: 16 MMOL/L — SIGNIFICANT CHANGE UP (ref 5–17)
AST SERPL-CCNC: 99 U/L — HIGH (ref 10–40)
BASOPHILS # BLD AUTO: 0.1 K/UL — SIGNIFICANT CHANGE UP (ref 0–0.2)
BASOPHILS NFR BLD AUTO: 0.8 % — SIGNIFICANT CHANGE UP (ref 0–2)
BILIRUB SERPL-MCNC: 1.4 MG/DL — HIGH (ref 0.2–1.2)
BUN SERPL-MCNC: 11 MG/DL — SIGNIFICANT CHANGE UP (ref 7–23)
CALCIUM SERPL-MCNC: 9 MG/DL — SIGNIFICANT CHANGE UP (ref 8.4–10.5)
CHLORIDE SERPL-SCNC: 101 MMOL/L — SIGNIFICANT CHANGE UP (ref 96–108)
CO2 SERPL-SCNC: 21 MMOL/L — LOW (ref 22–31)
CREAT SERPL-MCNC: 0.65 MG/DL — SIGNIFICANT CHANGE UP (ref 0.5–1.3)
EOSINOPHIL # BLD AUTO: 0.1 K/UL — SIGNIFICANT CHANGE UP (ref 0–0.5)
EOSINOPHIL NFR BLD AUTO: 0.8 % — SIGNIFICANT CHANGE UP (ref 0–6)
GLUCOSE BLDC GLUCOMTR-MCNC: 145 MG/DL — HIGH (ref 70–99)
GLUCOSE BLDC GLUCOMTR-MCNC: 198 MG/DL — HIGH (ref 70–99)
GLUCOSE BLDC GLUCOMTR-MCNC: 225 MG/DL — HIGH (ref 70–99)
GLUCOSE BLDC GLUCOMTR-MCNC: 284 MG/DL — HIGH (ref 70–99)
GLUCOSE SERPL-MCNC: 202 MG/DL — HIGH (ref 70–99)
HCT VFR BLD CALC: 34.6 % — SIGNIFICANT CHANGE UP (ref 34.5–45)
HCT VFR BLD CALC: 36.5 % — SIGNIFICANT CHANGE UP (ref 34.5–45)
HGB BLD-MCNC: 11.9 G/DL — SIGNIFICANT CHANGE UP (ref 11.5–15.5)
HGB BLD-MCNC: 12.6 G/DL — SIGNIFICANT CHANGE UP (ref 11.5–15.5)
LYMPHOCYTES # BLD AUTO: 1.7 K/UL — SIGNIFICANT CHANGE UP (ref 1–3.3)
LYMPHOCYTES # BLD AUTO: 20.9 % — SIGNIFICANT CHANGE UP (ref 13–44)
MCHC RBC-ENTMCNC: 31.7 PG — SIGNIFICANT CHANGE UP (ref 27–34)
MCHC RBC-ENTMCNC: 31.8 PG — SIGNIFICANT CHANGE UP (ref 27–34)
MCHC RBC-ENTMCNC: 34.5 GM/DL — SIGNIFICANT CHANGE UP (ref 32–36)
MCHC RBC-ENTMCNC: 34.6 GM/DL — SIGNIFICANT CHANGE UP (ref 32–36)
MCV RBC AUTO: 91.8 FL — SIGNIFICANT CHANGE UP (ref 80–100)
MCV RBC AUTO: 91.9 FL — SIGNIFICANT CHANGE UP (ref 80–100)
MONOCYTES # BLD AUTO: 0.8 K/UL — SIGNIFICANT CHANGE UP (ref 0–0.9)
MONOCYTES NFR BLD AUTO: 10 % — SIGNIFICANT CHANGE UP (ref 2–14)
NEUTROPHILS # BLD AUTO: 5.5 K/UL — SIGNIFICANT CHANGE UP (ref 1.8–7.4)
NEUTROPHILS NFR BLD AUTO: 67.5 % — SIGNIFICANT CHANGE UP (ref 43–77)
PLATELET # BLD AUTO: 267 K/UL — SIGNIFICANT CHANGE UP (ref 150–400)
PLATELET # BLD AUTO: 281 K/UL — SIGNIFICANT CHANGE UP (ref 150–400)
POTASSIUM SERPL-MCNC: 4.1 MMOL/L — SIGNIFICANT CHANGE UP (ref 3.5–5.3)
POTASSIUM SERPL-SCNC: 4.1 MMOL/L — SIGNIFICANT CHANGE UP (ref 3.5–5.3)
PROT SERPL-MCNC: 7 G/DL — SIGNIFICANT CHANGE UP (ref 6–8.3)
RBC # BLD: 3.77 M/UL — LOW (ref 3.8–5.2)
RBC # BLD: 3.97 M/UL — SIGNIFICANT CHANGE UP (ref 3.8–5.2)
RBC # FLD: 11.6 % — SIGNIFICANT CHANGE UP (ref 10.3–14.5)
RBC # FLD: 11.9 % — SIGNIFICANT CHANGE UP (ref 10.3–14.5)
SODIUM SERPL-SCNC: 138 MMOL/L — SIGNIFICANT CHANGE UP (ref 135–145)
WBC # BLD: 6.1 K/UL — SIGNIFICANT CHANGE UP (ref 3.8–10.5)
WBC # BLD: 8.1 K/UL — SIGNIFICANT CHANGE UP (ref 3.8–10.5)
WBC # FLD AUTO: 6.1 K/UL — SIGNIFICANT CHANGE UP (ref 3.8–10.5)
WBC # FLD AUTO: 8.1 K/UL — SIGNIFICANT CHANGE UP (ref 3.8–10.5)

## 2017-11-20 PROCEDURE — 99223 1ST HOSP IP/OBS HIGH 75: CPT

## 2017-11-20 PROCEDURE — 99291 CRITICAL CARE FIRST HOUR: CPT

## 2017-11-20 RX ORDER — ALBUTEROL 90 UG/1
2.5 AEROSOL, METERED ORAL ONCE
Qty: 0 | Refills: 0 | Status: COMPLETED | OUTPATIENT
Start: 2017-11-20 | End: 2017-11-20

## 2017-11-20 RX ORDER — FAMOTIDINE 10 MG/ML
20 INJECTION INTRAVENOUS ONCE
Qty: 0 | Refills: 0 | Status: COMPLETED | OUTPATIENT
Start: 2017-11-20 | End: 2017-11-20

## 2017-11-20 RX ORDER — POLYETHYLENE GLYCOL 3350 17 G/17G
17 POWDER, FOR SOLUTION ORAL
Qty: 0 | Refills: 0 | Status: DISCONTINUED | OUTPATIENT
Start: 2017-11-20 | End: 2017-11-21

## 2017-11-20 RX ORDER — DIPHENHYDRAMINE HCL 50 MG
50 CAPSULE ORAL ONCE
Qty: 0 | Refills: 0 | Status: COMPLETED | OUTPATIENT
Start: 2017-11-20 | End: 2017-11-20

## 2017-11-20 RX ORDER — EPINEPHRINE 0.3 MG/.3ML
0.3 INJECTION INTRAMUSCULAR; SUBCUTANEOUS ONCE
Qty: 0 | Refills: 0 | Status: COMPLETED | OUTPATIENT
Start: 2017-11-20 | End: 2017-11-20

## 2017-11-20 RX ORDER — ACETAMINOPHEN 500 MG
650 TABLET ORAL EVERY 6 HOURS
Qty: 0 | Refills: 0 | Status: DISCONTINUED | OUTPATIENT
Start: 2017-11-20 | End: 2017-11-21

## 2017-11-20 RX ORDER — LACTULOSE 10 G/15ML
20 SOLUTION ORAL ONCE
Qty: 0 | Refills: 0 | Status: COMPLETED | OUTPATIENT
Start: 2017-11-20 | End: 2017-11-20

## 2017-11-20 RX ORDER — OXYCODONE AND ACETAMINOPHEN 5; 325 MG/1; MG/1
2 TABLET ORAL EVERY 4 HOURS
Qty: 0 | Refills: 0 | Status: DISCONTINUED | OUTPATIENT
Start: 2017-11-20 | End: 2017-11-20

## 2017-11-20 RX ORDER — DEXAMETHASONE 0.5 MG/5ML
10 ELIXIR ORAL ONCE
Qty: 0 | Refills: 0 | Status: COMPLETED | OUTPATIENT
Start: 2017-11-20 | End: 2017-11-20

## 2017-11-20 RX ORDER — OXYCODONE AND ACETAMINOPHEN 5; 325 MG/1; MG/1
1 TABLET ORAL EVERY 4 HOURS
Qty: 0 | Refills: 0 | Status: DISCONTINUED | OUTPATIENT
Start: 2017-11-20 | End: 2017-11-20

## 2017-11-20 RX ORDER — SODIUM CHLORIDE 9 MG/ML
1000 INJECTION INTRAMUSCULAR; INTRAVENOUS; SUBCUTANEOUS
Qty: 0 | Refills: 0 | Status: DISCONTINUED | OUTPATIENT
Start: 2017-11-20 | End: 2017-11-21

## 2017-11-20 RX ORDER — DIPHENHYDRAMINE HCL 50 MG
50 CAPSULE ORAL EVERY 4 HOURS
Qty: 0 | Refills: 0 | Status: DISCONTINUED | OUTPATIENT
Start: 2017-11-20 | End: 2017-11-21

## 2017-11-20 RX ADMIN — MONTELUKAST 10 MILLIGRAM(S): 4 TABLET, CHEWABLE ORAL at 18:27

## 2017-11-20 RX ADMIN — ATORVASTATIN CALCIUM 20 MILLIGRAM(S): 80 TABLET, FILM COATED ORAL at 22:40

## 2017-11-20 RX ADMIN — EPINEPHRINE 0.3 MILLIGRAM(S): 0.3 INJECTION INTRAMUSCULAR; SUBCUTANEOUS at 10:15

## 2017-11-20 RX ADMIN — Medication 50 MILLIGRAM(S): at 17:41

## 2017-11-20 RX ADMIN — ENOXAPARIN SODIUM 40 MILLIGRAM(S): 100 INJECTION SUBCUTANEOUS at 17:39

## 2017-11-20 RX ADMIN — LACTULOSE 20 GRAM(S): 10 SOLUTION ORAL at 08:17

## 2017-11-20 RX ADMIN — Medication 2: at 11:46

## 2017-11-20 RX ADMIN — Medication 102 MILLIGRAM(S): at 10:06

## 2017-11-20 RX ADMIN — Medication 50 MILLIGRAM(S): at 05:44

## 2017-11-20 RX ADMIN — OXYCODONE AND ACETAMINOPHEN 2 TABLET(S): 5; 325 TABLET ORAL at 08:09

## 2017-11-20 RX ADMIN — FAMOTIDINE 20 MILLIGRAM(S): 10 INJECTION INTRAVENOUS at 09:56

## 2017-11-20 RX ADMIN — Medication 40 MILLIGRAM(S): at 17:40

## 2017-11-20 RX ADMIN — Medication 50 MILLIGRAM(S): at 17:40

## 2017-11-20 RX ADMIN — AMLODIPINE BESYLATE 10 MILLIGRAM(S): 2.5 TABLET ORAL at 05:44

## 2017-11-20 RX ADMIN — Medication 50 MILLIGRAM(S): at 09:55

## 2017-11-20 RX ADMIN — ALBUTEROL 2.5 MILLIGRAM(S): 90 AEROSOL, METERED ORAL at 10:00

## 2017-11-20 RX ADMIN — Medication 81 MILLIGRAM(S): at 17:40

## 2017-11-20 RX ADMIN — Medication 50 MILLIGRAM(S): at 22:41

## 2017-11-20 RX ADMIN — Medication 100 MILLIGRAM(S): at 05:44

## 2017-11-20 RX ADMIN — ALBUTEROL 2.5 MILLIGRAM(S): 90 AEROSOL, METERED ORAL at 09:52

## 2017-11-20 RX ADMIN — TIOTROPIUM BROMIDE 1 CAPSULE(S): 18 CAPSULE ORAL; RESPIRATORY (INHALATION) at 11:22

## 2017-11-20 RX ADMIN — Medication 100 MILLIGRAM(S): at 22:40

## 2017-11-20 RX ADMIN — Medication 650 MILLIGRAM(S): at 23:10

## 2017-11-20 RX ADMIN — Medication 3: at 17:51

## 2017-11-20 RX ADMIN — POLYETHYLENE GLYCOL 3350 17 GRAM(S): 17 POWDER, FOR SOLUTION ORAL at 18:28

## 2017-11-20 RX ADMIN — POLYETHYLENE GLYCOL 3350 17 GRAM(S): 17 POWDER, FOR SOLUTION ORAL at 08:17

## 2017-11-20 RX ADMIN — ALBUTEROL 2.5 MILLIGRAM(S): 90 AEROSOL, METERED ORAL at 10:15

## 2017-11-20 RX ADMIN — SENNA PLUS 2 TABLET(S): 8.6 TABLET ORAL at 22:40

## 2017-11-20 RX ADMIN — Medication 650 MILLIGRAM(S): at 22:40

## 2017-11-20 RX ADMIN — OXYCODONE AND ACETAMINOPHEN 2 TABLET(S): 5; 325 TABLET ORAL at 08:40

## 2017-11-20 RX ADMIN — LISINOPRIL 2.5 MILLIGRAM(S): 2.5 TABLET ORAL at 05:44

## 2017-11-20 NOTE — CONSULT NOTE ADULT - ASSESSMENT
61F asthma (on Advair, Spiriva, Singulair), DM2, HTN, HLD who initially presented for S1 laminectomy for spinal stenosis and lumbar back pain now with course complicated by allergic reaction and respiratory distress which improved after treatment from unknown trigger

## 2017-11-20 NOTE — PROGRESS NOTE ADULT - ASSESSMENT
Summary: s/p L4-S1 laminectomy, experienced anaphylactoid response to unknown stimulus.  Patient admitted to NSCU for close monitoring.    NEURO:  q4h neurochecks    CARDS:  -150    PULM:  sat > 92%    RENAL:  IVL    GASTRO:  CCD  ---> Stress ulcer prophylaxis:  no     HEME:  monitor H/H w/diff and take note of eosinophil count    ---> DVT prophylaxis: SCDs, lovenox    ENDO:  euglycemia    ID:  afebrile    Code status:  Full code  Disposition:  ICU    This patient was at high risk of neurologic deterioration and/or death due to: anaphylaxis

## 2017-11-20 NOTE — CONSULT NOTE ADULT - PROBLEM SELECTOR RECOMMENDATION 2
-continue nebulizers  -was on Advair - can use Symbicort in hospital  -continue Spiriva and Singulair  -outpatient followup with Dr. Salazar  -incentive spirometer and pain control to prevent postop atelectasis

## 2017-11-20 NOTE — CONSULT NOTE ADULT - PROBLEM SELECTOR RECOMMENDATION 2
Previous admission for asthma attack is not typical for asthma exacerbation in this patient who had no history of asthma prior to age of 61.  Possibly secondary to anaphylaxis at that time as well.    Recommend continued follow up with pulmonology. Previous admission for asthma attack is not typical for asthma exacerbation in this patient who had no history of asthma prior to age of 61.    Recommend continued follow up with pulmonology.

## 2017-11-20 NOTE — CONSULT NOTE ADULT - PROBLEM SELECTOR RECOMMENDATION 9
-continue steroids, antihistamines  -airway monitoring - patient being transferred to NSCU currently  -maintain O2 sat > 90  -would ensure patient has epipen on discharge  -unclear source but given that this has happened in past without clear cause would advocate discontinuing her ACEI at this time  -no stridor at present

## 2017-11-20 NOTE — PROGRESS NOTE ADULT - ASSESSMENT
HPI:    PROCEDURE: L4-S1 laminectomy  POD# 3    PLAN:  Neuro: stable, dc drain if leaving later if pain control improved, otherwise in a.m. prior to discharge, stop valium no spasms.  increase pain medicine  Respiratory: Incentive spirometer  CV:stable, norvasc, lipitor, lisinopril  Endocrine: satisfactory glycemic control  Heme/Onc:    postop 14.2/40         DVT ppx: lovenox 40, asa 81  Renal: stable  ID: afebrile  GI:   CCD, no bm postitive flatus, will incr. bowel regimen now by adding miralax/lactulose                       GI ppx: not needed  PT/OT: cleared for home by PT with family assistance  Will discuss with ___      Spectralink # [id number]

## 2017-11-20 NOTE — CONSULT NOTE ADULT - PROBLEM SELECTOR RECOMMENDATION 3
-s/p S1 laminectomy  -appreciate surgical management  -pain control  -PT/OOB
Avoid penicillins and sulfa medications  Follow up outpatient for possible skin testing

## 2017-11-20 NOTE — PROVIDER CONTACT NOTE (OTHER) - ASSESSMENT
Pt rang call bell to say I think im having an allergic reaction. Pt also c/o itchiness to B/L hands murtaza to Right hand. Within a minute pt started having hives to right hand and chin. Also states she feels like her tongue was slightly swollen.
upon arrival to room, pt states she feels like there is an object in her throat obstructing her from breathing. she felt as though she was unable to take a deep breath. o2 sat 97% RA, RR 18, p:60, BP: 135/88

## 2017-11-20 NOTE — CHART NOTE - NSCHARTNOTEFT_GEN_A_CORE
Called by NSx PA for asthma and hives.  Saw patient at bedside.  APtietn difficulty speaking full sentences.  States around 10 minutes prior started to have hives and hand swelling of left with associated SOB abd wheeze.  Had grapes from cafeteria, otherwise per aptient and RN no new medications other than lactulose.  Patient 79% on ventimask prior to me coming to room, already received decadron 10 IVx1, pepcid, albuterol x1 and benadryl 50mg IV.  Patient seen and examined, VS at time of initial exam was PO2=93% on ventimask, HR=70, 's, RR=24.    Physical exam revealed:   GEN: Moderate respiratory distress  HEENT: slightly swollen tongue with hives on chin.  able to see oropharynx, slight erythema in pharynx.   RESP: No stridor. Exp wheezing, improved after albuterol  ABD: obsese. NT/ND. +BS  EXT: no c/c/e  Skin: wheals on left hand.    Plan:   Given inability to complete full sentences, tongue involvement, called for Epipen from pharmacy, administered by PA into left lateral thigh.  Symptoms began to resolve over next 2-3 minutes.  Able to discuss with patietn more, prior episode of intubation due to "asthma" at OSH in May, occurred abruptly aver 2 coughs, diagnosed with asha for first time, no known prior asthma exacerbations as child, no diatnosis as adult until age 63.  No known cause of intubation at that time along with no cause at this time.  Per NSx resident, rash with vancomycin at time of surgery was discontinued    A/P: 62 yo female questionable asthma vs. prior anaphylaxis 2/2 unknown etiology, CAD s/p stent POD2 for lami with anaphylaxis.     1. NSCU called at time of event, will follow re: need for observation in unit for 24 hours.  -steroids ATC for 24 ours.  -benadryl prn  -epipen to be at bedside incase of other event.  -recommend allergy consult as cannot find causative agent.  On lisinopril but that hsould not cause anaphylaxis, just angioedema.  -continuous pulse ox for 24 hours.  -critical care time 35 minutes  -full consult to follow if patient does not go to a unit.

## 2017-11-20 NOTE — CONSULT NOTE ADULT - PROBLEM SELECTOR RECOMMENDATION 4
-DVT proph  -GI proph with diet  -Maintain O2 sat > 90  -Epipen  -would consider discontinuing ACE inhibitor
Laboratory tests requested as above- differential includes mast cell disorders.  Will follow up results and will perform urine histamine metabolite test on 24 hour urine when patient is seen outpatient.

## 2017-11-20 NOTE — CONSULT NOTE ADULT - ASSESSMENT
61 year old female with h/o previous MI s/p PCI with stenting (2000), HTN, T2DM, Dyslipidemia, mild intermittent asthma ( last exacerbation May 2017) , iron deficiency anemia, admitted via SDA for L4-S1 decompressive lumbar laminectomy for spinal stenosis, now with anaphylaxis.    Upon review of patient's history, she appears to have had multiple anaphylactic type reactions in the past, however with no identified inciting factors.    For this episode, the timing of the event makes the most likely culprit percocet.  Opioids are known mast cell degranulators and therefore can cause non-IgE mediated anaphylaxis. Would recommend avoidance of opioids as much as possible in this patient.  Skin testing is not possible in this patient due to her recent antihistamine administrations   Other causes of angioedema such as lisinopril can be considered, however is less likely to cause wheezing and rash with the angioedema.      Recommend follow up with Allergy upon discharge for this patient for possible skin testing to her multiple drug allergies. 61 year old female with h/o previous MI s/p PCI with stenting (2000), HTN, T2DM, Dyslipidemia, mild intermittent asthma ( last exacerbation May 2017) , iron deficiency anemia, admitted via SDA for L4-S1 decompressive lumbar laminectomy for spinal stenosis, now with anaphylaxis.    Upon review of patient's history, she appears to have had multiple anaphylactic type reactions in the past, however with no identified inciting factors. The differential diagnosis for this patient with recurrent anaphylaxis should include mastocytosis or mast cell disorder.  Work up for this has been initiated (CBC, CMP, serum tryptase).  	  For this episode, the timing of the event makes the most likely culprit percocet.  Opioids are known mast cell degranulators and therefore can cause non-IgE mediated anaphylaxis. Would recommend avoidance of opioids, particularly oxycodone as much as possible in this patient.  Skin testing is not possible in this patient due to her recent antihistamine administration.   Other causes of angioedema such as lisinopril can be considered, albeit less likely to cause wheezing and rash with the angioedema.  However, other agents should be considered if possible.     Recommend follow up with Allergy upon discharge for this patient for work up for possible mast cell disorder or mastocytosis. 61 year old female with h/o previous MI s/p PCI with stenting (2000), HTN, T2DM, Dyslipidemia, mild intermittent asthma ( last exacerbation May 2017) , iron deficiency anemia, admitted via SDA for L4-S1 decompressive lumbar laminectomy for spinal stenosis, now with resolved anaphylaxis.    Upon review of patient's history, she appears to have had multiple anaphylactic type reactions in the past.  However, with no consistent identified inciting factors, the differential diagnosis for this patient with recurrent anaphylaxis should include mastocytosis or mast cell disorder.  Work up for this has been initiated (laboratory tests that include CBC, CMP, serum tryptase).  	  For this episode, the timing of the event makes the most likely culprit percocet.  Opioids are known mast cell degranulators and therefore can cause non-IgE mediated reactions leading to systemic histamine release and consequent symptoms consistent with anaphylaxis type reactions. We recommend avoidance of opioids, particularly oxycodone as much as possible in this patient.  Skin testing is not possible in this patient due to her recent antihistamine administration, and poor options for testing to these agents.   Other causes of angioedema such as lisinopril can be considered, albeit less likely to cause wheezing and rash with the angioedema.  However, other agents should be considered if possible.     Recommend follow up with Allergy upon discharge for this patient for work up for possible mast cell disorder or mastocytosis.

## 2017-11-20 NOTE — PROVIDER CONTACT NOTE (OTHER) - ACTION/TREATMENT ORDERED:
Pt given benadryl 50mg ivp, pepcid 20g ivp, decadron 10mg ivss, epi pen x1, albuterol via mask x3 and cbc cmp stat done. Pt placed on cont pulse ox and weaned off mask and on to O2 3L NC.
MD made aware, nebulizer treatment ordered.

## 2017-11-20 NOTE — CONSULT NOTE ADULT - PROBLEM SELECTOR RECOMMENDATION 9
As stated above, unclear of etiology but recommend avoidance of opioids in this patient as they are known mast cell degranulators.    Recommend follow up in allergy clinic for possible multiple drug allergies and investigation of food intolerances. As stated above, unclear of etiology but recommend avoidance of opioids in this patient as they are known mast cell degranulators.    Recommend follow up in allergy clinic for possible multiple drug allergies and possible mast cell disorder.   Recommend continued singulair and use of benadryl as needed.  Patient should be discharged with EpiPen. As stated above, unclear of etiology but recommend avoidance of opioids in this patient as they are known mast cell degranulators.    Recommend follow up in allergy clinic for possible multiple drug allergies and possible mast cell disorder.   Recommend continued singulair and use of benadryl as needed.  Patient should be discharged with EpiPen and appropriate instructions for use.

## 2017-11-20 NOTE — CONSULT NOTE ADULT - SUBJECTIVE AND OBJECTIVE BOX
[facility] DIVISION OF PULMONARY, CRITICAL CARE AND SLEEP MEDICINE  PULMONARY CONSULTATION NOTE  PHONE NUMBER [phone] MONDAY-FRIDAY 8A-5P or [phone] on NIGHTS/WEEKENDS/HOLIDAYS    NAME: MARIZA CONN  MEDICAL RECORD NUMBER: [id number]    CHIEF COMPLAINT: Patient is a 61y old  Female who presents with a chief complaint of back surgery    HISTORY OF PRESENT ILLNESS:  61F CAD s/p PCI, mild intermittent asthma, DM2, HTN, HLD who initially presented with back pain radiating to her buttocks presenting for S1 decompressive lumbar laminectomy . She is now POD 3 and has been doing well from a postop standpoint. She is walking with a walker. She has a YEIMI drain in place with plans for possibly removing it later today or tomorrow.    With regards to her asthma her breathing is stable. She denies cough, sputum production or hemoptysis. She takes her medications regularly as prescribed. In the preop period she had pulmonary functioning testing with Dr. Salazar. Today, she had some allergic reaction this AM with hives and itching on her hands and then swelling of her tongue and throat. She had some wheezing at that time and was noted to desaturate to 79%. After steroids, benadryl, pepcid and supplemental oxygen she now feels better and without respiratory distress. She does not have stridor at present. She states she has had allergy reactions in the past. She has not taken new medications and she had grapes and berries to eat.      ====================BACKGROUND INFORMATION============================    FAMILY HISTORY: FAMILY HISTORY: noncontributory      PAST MEDICAL AND SURGICAL HISTORY: PAST MEDICAL & SURGICAL HISTORY:  Scoliosis  CAD (coronary artery disease): h/o PCI with stenting x1  Spinal stenosis of lumbar region  Dyslipidemia  H/O: iron deficiency anemia: h/o blood transfusion in 2002  Mild persistent asthma in adult without complication: last rescue inhaler used over 6 months ago  Snores  T2DM (type 2 diabetes mellitus): no daily fingerstick   last A1C  (10+) as per patient  H/O: HTN (hypertension): on medication  History of myocardial infarction: h/o upper back and left arm pain prompted an angioplasty with stenting x 1 artery in 2000  last echo/ ekg April 2017  H/O colonoscopy  History of cholecystectomy      ALLERGIES:Allergies    penicillin (Pruritus; Rash)  sulfa drugs (Pruritus; Rash)    Intolerances    HOME MEDICATIONS: reviewed    OUTPATIENT PULMONARY DOCTOR: Dr. Wilson Salazar    SOCIAL HISTORY  TOBACCO USE: denied  ALCOHOL: social   DRUGS: denied  MARITAL STATUS:   EXPOSURES: none  RECENT TRAVELS: none  CODE STATUS: full code    ====================REVIEW OF SYSTEMS=====================================  CONSTITUTIONAL: allergic reaction this AM with hives and respiratory distress  CARDIOVASCULAR: denies chest pain or palpitations  PULMONARY: transient wheezing, no cough, no hemoptysis  GASTROINTESTINAL: denies nausea, vomiting or abdominal pain  [x] ALL OTHER REVIEW OF SYSTEMS ARE NEGATIVE   [] UNABLE TO OBTAIN REVIEW OF SYSTEMS DUE TO ______________      ====================PHYSICAL EXAM=========================================    VITALS: ICU Vital Signs Last 24 Hrs  T(C): 36.9 (20 Nov 2017 04:55), Max: 36.9 (20 Nov 2017 04:55)  T(F): 98.5 (20 Nov 2017 04:55), Max: 98.5 (20 Nov 2017 04:55)  HR: 78 (20 Nov 2017 10:30) (63 - 81)  BP: 144/77 (20 Nov 2017 10:30) (119/72 - 162/74)  RR: 18 (20 Nov 2017 04:55) (18 - 18)  SpO2: 97% (20 Nov 2017 10:30) (79% - 97%)      INTAKE and OUTPUT: I&O's Summary    19 Nov 2017 07:01  -  20 Nov 2017 07:00  --------------------------------------------------------  IN: 860 mL / OUT: 3645 mL / NET: -2785 mL    WEIGHT: Daily     Daily     GLUCOSE: CAPILLARY BLOOD GLUCOSE      POCT Blood Glucose.: 225 mg/dL (20 Nov 2017 11:43)    VENTILATOR SETTINGS: N/A    GENERAL: AAOx3, NAD, sitting comfortably in bed  HEENT: EOMI, normocephalic, atraumatic, MMM, nares clear  NECK: supple, no JVD  LYMPH NODES: no cervical LAD  CARDIOVASCULAR: RRR, S1S2, no murmur  PULMONARY: CTA bilaterally, no wheeze, rales or rhonchi, no stridor  ABDOMEN: soft, NT, ND, +BS  SKIN: warm and dry  EXTREMITIES: no pain, no clubbing or cyanosis  NEUROLOGIC: intact, moves all extremities, moves with walker  PSYCHIATRIC: calm and in good spirits    ====================MEDICATIONS===========================================  MEDICATIONS  (STANDING):  amLODIPine   Tablet 10 milliGRAM(s) Oral daily  aspirin  chewable 81 milliGRAM(s) Oral daily  atorvastatin 20 milliGRAM(s) Oral at bedtime  dextrose 5%. 1000 milliLiter(s) (50 mL/Hr) IV Continuous <Continuous>  dextrose 50% Injectable 12.5 Gram(s) IV Push once  dextrose 50% Injectable 25 Gram(s) IV Push once  dextrose 50% Injectable 25 Gram(s) IV Push once  diphenhydrAMINE   Injectable 50 milliGRAM(s) IV Push every 4 hours  docusate sodium 100 milliGRAM(s) Oral three times a day  enoxaparin Injectable 40 milliGRAM(s) SubCutaneous daily  insulin lispro (HumaLOG) corrective regimen sliding scale   SubCutaneous three times a day before meals  insulin lispro (HumaLOG) corrective regimen sliding scale   SubCutaneous at bedtime  lisinopril 2.5 milliGRAM(s) Oral daily  methylPREDNISolone sodium succinate Injectable 40 milliGRAM(s) IV Push two times a day  metoprolol     tartrate 50 milliGRAM(s) Oral two times a day  montelukast 10 milliGRAM(s) Oral daily  polyethylene glycol 3350 17 Gram(s) Oral two times a day  senna 2 Tablet(s) Oral at bedtime  sodium chloride 0.9%. 1000 milliLiter(s) (20 mL/Hr) IV Continuous <Continuous>  tiotropium 18 MICROgram(s) Capsule 1 Capsule(s) Inhalation daily      MEDICATIONS  (PRN):  aluminum hydroxide/magnesium hydroxide/simethicone Suspension 30 milliLiter(s) Oral every 12 hours PRN Indigestion  dextrose Gel 1 Dose(s) Oral once PRN Blood Glucose LESS THAN 70 milliGRAM(s)/deciliter  glucagon  Injectable 1 milliGRAM(s) IntraMuscular once PRN Glucose LESS THAN 70 milligrams/deciliter  ondansetron Injectable 4 milliGRAM(s) IV Push every 6 hours PRN Nausea  oxyCODONE    5 mG/acetaminophen 325 mG 1 Tablet(s) Oral every 4 hours PRN Moderate Pain (4 - 6)  oxyCODONE    5 mG/acetaminophen 325 mG 2 Tablet(s) Oral every 4 hours PRN Severe Pain (7 - 10)      ====================LABORATORY RESULTS====================================  CBC Full  -  ( 20 Nov 2017 10:32 )  WBC Count : 8.1 K/uL  Hemoglobin : 12.6 g/dL  Hematocrit : 36.5 %  Platelet Count - Automated : 281 K/uL  Mean Cell Volume : 91.9 fl  Mean Cell Hemoglobin : 31.8 pg  Mean Cell Hemoglobin Concentration : 34.6 gm/dL  Auto Neutrophil # : 5.5 K/uL  Auto Lymphocyte # : 1.7 K/uL  Auto Monocyte # : 0.8 K/uL  Auto Eosinophil # : 0.1 K/uL  Auto Basophil # : 0.1 K/uL  Auto Neutrophil % : 67.5 %  Auto Lymphocyte % : 20.9 %  Auto Monocyte % : 10.0 %  Auto Eosinophil % : 0.8 %  Auto Basophil % : 0.8 %                                    11-20    138  |  101  |  11  ----------------------------<  202<H>  4.1   |  21<L>  |  0.65    Ca    9.0      20 Nov 2017 10:32    TPro  7.0  /  Alb  3.9  /  TBili  1.4<H>  /  DBili  x   /  AST  99<H>  /  ALT  41  /  AlkPhos  95  11-20            Creatinine Trend: 0.65<--, 0.53<--      ====================RADIOLOGY and ECHOCARDIOGRAPHY=======================    CXR:     CT CHEST:    ECHOCARDIOGRAPHY:    POINT OF CARE ULTRASOUND:

## 2017-11-20 NOTE — CONSULT NOTE ADULT - SUBJECTIVE AND OBJECTIVE BOX
Consult Requested by:      HPI:  61 year old female with h/o previous MI s/p PCI with stenting (2000), HTN, T2DM, Dyslipidemia, mild intermittent asthma ( last exacerbation May 2017) , iron deficiency anemia, admitted via SDA for L4-S1 decompressive lumbar laminectomy for spinal stenosis.  On the morning of 11/20 patient suddenly developed hives, wheels, swelling of the left hand, SOB, wheezing, progressing to difficulty speaking full sentences, tongue swelling and facial rash.  Patient ate grapes, strawberries and blueberries, otherwise no new medications other than lactulose, however she had taken percocet less than 1 hour prior to the reaction.  Placed on ventimask, received decadron 10 IVx1, pepcid, albuterol x1 and benadryl 50mg IV, and epinephrine.  Reaction subsided but patient was transferred to the NSCU for direct observation given the lack of clear inciting factor.  Patient reports requiring an epi pen on several other occasions.  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  No inciting factor was identified.   2000: patient had episode of throat tightening x2, once after walking down a street fair which she attributes to smelling smoke from food being cooked, and once after smelling strong carpet cleaning supplies.    2005: patient reports that after eating crabmeat stuffed flounder she had swelling of face and difficulty breathing, she treated with benadryl with relief of symptoms.  Has eaten shellfish and fish since then, most recently ate same meal (crabmeat stuffed flounder) about 1.5 months ago  May 2017: patient reports that she developed cough which within minutes turned into a coughing fit and she was unable to breathe, called EMS and was given Epipen, required CPAP at hospital.  She was diagnosed with asthma at this time, however she never had asthma prior to this time.  She reports that she follows with pulmonology but was told that her breathing tests do not look like asthma necessarily.     Drug allergies: patient reports an episode about 15 years ago when she got percocet, took 1 pill and then had diffuse itching, no rash at the time.  Does not think she has taken any prescription pain medications since that time outside of this hospital admission.  Here she tolerated dilaudid.   Patient reports that many years ago she was prescribed penicillin followed by a sulfa drug.  She took the penicillin x5 days then had taken 2 days of the sulfa drug when she developed widespread itching and hives.  Has not taken penicillin, amoxicillin or sulfa drugs since that time.   Food allergies: patient reports that she eats a varied diet including peanuts, treenuts, shellfish and fish.  She reports that aside from the one episode with crabmeat as listed above, she tolerates all shellfish.  However she does report itching of the hands if she comes in contact with the crab shells/skin.      Allergies    penicillin (Pruritus; Rash)  sulfa drugs (Pruritus; Rash)    Intolerances      MEDICATIONS  (STANDING):  amLODIPine   Tablet 10 milliGRAM(s) Oral daily  aspirin  chewable 81 milliGRAM(s) Oral daily  atorvastatin 20 milliGRAM(s) Oral at bedtime  dextrose 5%. 1000 milliLiter(s) (50 mL/Hr) IV Continuous <Continuous>  dextrose 50% Injectable 12.5 Gram(s) IV Push once  dextrose 50% Injectable 25 Gram(s) IV Push once  dextrose 50% Injectable 25 Gram(s) IV Push once  diphenhydrAMINE   Injectable 50 milliGRAM(s) IV Push every 4 hours  docusate sodium 100 milliGRAM(s) Oral three times a day  enoxaparin Injectable 40 milliGRAM(s) SubCutaneous daily  insulin lispro (HumaLOG) corrective regimen sliding scale   SubCutaneous three times a day before meals  insulin lispro (HumaLOG) corrective regimen sliding scale   SubCutaneous at bedtime  lisinopril 2.5 milliGRAM(s) Oral daily  methylPREDNISolone sodium succinate Injectable 40 milliGRAM(s) IV Push two times a day  metoprolol     tartrate 50 milliGRAM(s) Oral two times a day  montelukast 10 milliGRAM(s) Oral daily  polyethylene glycol 3350 17 Gram(s) Oral two times a day  senna 2 Tablet(s) Oral at bedtime  sodium chloride 0.9%. 1000 milliLiter(s) (20 mL/Hr) IV Continuous <Continuous>  tiotropium 18 MICROgram(s) Capsule 1 Capsule(s) Inhalation daily    MEDICATIONS  (PRN):  aluminum hydroxide/magnesium hydroxide/simethicone Suspension 30 milliLiter(s) Oral every 12 hours PRN Indigestion  dextrose Gel 1 Dose(s) Oral once PRN Blood Glucose LESS THAN 70 milliGRAM(s)/deciliter  glucagon  Injectable 1 milliGRAM(s) IntraMuscular once PRN Glucose LESS THAN 70 milligrams/deciliter  ondansetron Injectable 4 milliGRAM(s) IV Push every 6 hours PRN Nausea      PAST MEDICAL & SURGICAL HISTORY:  Scoliosis  CAD (coronary artery disease): h/o PCI with stenting x1  Spinal stenosis of lumbar region  Dyslipidemia  H/O: iron deficiency anemia: h/o blood transfusion in 2002  Mild persistent asthma in adult without complication: last rescue inhaler used over 6 months ago  Snores  T2DM (type 2 diabetes mellitus): no daily fingerstick   last A1C  (10+) as per patient  H/O: HTN (hypertension): on medication  History of myocardial infarction: h/o upper back and left arm pain prompted an angioplasty with stenting x 1 artery in 2000  last echo/ ekg April 2017  H/O colonoscopy  History of cholecystectomy      REVIEW OF SYSTEMS  All review of systems negative, except for those marked:  General:	no headache  Eyes:			none  ENT:			none  Pulmonary:		+ wheezing, resolved  Cardiac:		no chest pain  Gastrointestinal:	 constipation  Renal/Urologic:		none  Musculoskeletal:	 none  Skin:		+rash, resolved  Neurologic:	none	  Psychiatric:	none	  Endocrine:	+history of DM		  Allergy/Immune:	 history of drug allergy    SOCIAL/ENVIRONMENTAL HISTORY:  Tobacco	never smoker      FAMILY HISTORY:  No pertinent family medical history  Allergies		[x] No	[] Yes:   Asthma			[x] No	[] Yes:  Immune Deficiency	[X] No	[] Yes:   Other:			[] No	[] Yes:    Vital Signs Last 24 Hrs  T(C): 36.5 (20 Nov 2017 15:00), Max: 36.9 (20 Nov 2017 04:55)  T(F): 97.7 (20 Nov 2017 15:00), Max: 98.5 (20 Nov 2017 04:55)  HR: 61 (20 Nov 2017 16:00) (57 - 81)  BP: 129/63 (20 Nov 2017 16:00) (115/73 - 162/74)  BP(mean): 83 (20 Nov 2017 16:00) (83 - 89)  RR: 18 (20 Nov 2017 16:00) (15 - 30)  SpO2: 99% (20 Nov 2017 16:00) (79% - 99%)    PHYSICAL EXAM  All physical exam findings normal, except for those marked:  General:	alert, no acute distress  Eyes      no conjunctival injection, no discharge, no photophobia, intact                 extraocular movements, sclera not icteric, no suborbital bogginess  ENT:    external ear normal, normal nasal mucosa  .		and turbinates without discharge, no pharyngeal erythema or exudates, no  .		cobblestoning, normal tongue and lips, normal tonsils   Neck    supple, full range of motion  Lymph Nodes	normal size and consistency, non-tender  Cardiovascular	regular rate and rhythm; Normal S1, S2; No murmur  Respiratory	good air movement bilaterally, no wheezing or crackles,  no retractions  Abdominal	soft; ND, NT, no hepatosplenomegaly, + bowel sounds  Skin		skin intact; no rash,   Neurologic	alert, appropriate for age, cranial nerves II-XII grossly normal  Musculoskeletal	 no joint swelling, erythema, or tenderness; full range of motion  .			with no contractures; no muscle tenderness; no clubbing; no cyanosis;  .			no edema    Lab Results:                        12.6   8.1   )-----------( 281      ( 20 Nov 2017 10:32 )             36.5     11-20    138  |  101  |  11  ----------------------------<  202<H>  4.1   |  21<L>  |  0.65    Ca    9.0      20 Nov 2017 10:32    TPro  7.0  /  Alb  3.9  /  TBili  1.4<H>  /  DBili  x   /  AST  99<H>  /  ALT  41  /  AlkPhos  95  11-20    LIVER FUNCTIONS - ( 20 Nov 2017 10:32 )  Alb: 3.9 g/dL / Pro: 7.0 g/dL / ALK PHOS: 95 U/L / ALT: 41 U/L RC / AST: 99 U/L / GGT: x                 IMAGING STUDIES: HPI:  61 year old female with h/o previous MI s/p PCI with stenting (2000), HTN, T2DM, Dyslipidemia, mild intermittent asthma ( last exacerbation May 2017) , iron deficiency anemia, admitted via SDA for L4-S1 decompressive lumbar laminectomy for spinal stenosis.  On the morning of 11/20 patient suddenly developed hives, wheels, swelling of the left hand, SOB, wheezing, progressing to difficulty speaking full sentences, tongue swelling and facial rash.  Patient ate grapes, strawberries and blueberries, otherwise no new medications other than lactulose, however she had taken percocet less than 1 hour prior to the reaction.  Placed on ventimask, received decadron 10 IVx1, pepcid, albuterol x1 and benadryl 50mg IV, and epinephrine.  Reaction subsided but patient was transferred to the NSCU for direct observation given the lack of clear inciting factor.  Patient reports requiring an epi pen on several other occasions.  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  No inciting factor was identified.   2000: patient had episode of throat tightening x2, once after walking down a street fair which she attributes to smelling smoke from food being cooked, and once after smelling strong carpet cleaning supplies.    2005: patient reports that after eating crabmeat stuffed flounder she had swelling of face and difficulty breathing, she treated with benadryl with relief of symptoms.  Has eaten shellfish and fish since then, most recently ate same meal (crabmeat stuffed flounder) about 1.5 months ago  May 2017: patient reports that she developed cough which within minutes turned into a coughing fit and she was unable to breathe, called EMS and was given Epipen, required CPAP at hospital.  She was diagnosed with asthma at this time, however she never had asthma prior to this time.  She reports that she follows with pulmonology but was told that her breathing tests do not look like asthma necessarily.     Drug allergies: patient reports an episode about 15 years ago when she got percocet, took 1 pill and then had diffuse itching, no rash at the time.  Does not think she has taken any prescription pain medications since that time outside of this hospital admission.  Here she tolerated dilaudid.   Patient reports that many years ago she was prescribed penicillin followed by a sulfa drug.  She took the penicillin x5 days then had taken 2 days of the sulfa drug when she developed widespread itching and hives.  Has not taken penicillin, amoxicillin or sulfa drugs since that time.   She reports taking acetominophen and ibuprofen at home without any difficulties.    Food allergies: patient reports that she eats a varied diet including peanuts, treenuts, shellfish and fish.  She reports that aside from the one episode with crabmeat as listed above, she tolerates all shellfish.  However she does report itching of the hands if she comes in contact with the crab shells/skin.      Allergies    penicillin (Pruritus; Rash)  sulfa drugs (Pruritus; Rash)    Intolerances      MEDICATIONS  (STANDING):  amLODIPine   Tablet 10 milliGRAM(s) Oral daily  aspirin  chewable 81 milliGRAM(s) Oral daily  atorvastatin 20 milliGRAM(s) Oral at bedtime  dextrose 5%. 1000 milliLiter(s) (50 mL/Hr) IV Continuous <Continuous>  dextrose 50% Injectable 12.5 Gram(s) IV Push once  dextrose 50% Injectable 25 Gram(s) IV Push once  dextrose 50% Injectable 25 Gram(s) IV Push once  diphenhydrAMINE   Injectable 50 milliGRAM(s) IV Push every 4 hours  docusate sodium 100 milliGRAM(s) Oral three times a day  enoxaparin Injectable 40 milliGRAM(s) SubCutaneous daily  insulin lispro (HumaLOG) corrective regimen sliding scale   SubCutaneous three times a day before meals  insulin lispro (HumaLOG) corrective regimen sliding scale   SubCutaneous at bedtime  lisinopril 2.5 milliGRAM(s) Oral daily  methylPREDNISolone sodium succinate Injectable 40 milliGRAM(s) IV Push two times a day  metoprolol     tartrate 50 milliGRAM(s) Oral two times a day  montelukast 10 milliGRAM(s) Oral daily  polyethylene glycol 3350 17 Gram(s) Oral two times a day  senna 2 Tablet(s) Oral at bedtime  sodium chloride 0.9%. 1000 milliLiter(s) (20 mL/Hr) IV Continuous <Continuous>  tiotropium 18 MICROgram(s) Capsule 1 Capsule(s) Inhalation daily    MEDICATIONS  (PRN):  aluminum hydroxide/magnesium hydroxide/simethicone Suspension 30 milliLiter(s) Oral every 12 hours PRN Indigestion  dextrose Gel 1 Dose(s) Oral once PRN Blood Glucose LESS THAN 70 milliGRAM(s)/deciliter  glucagon  Injectable 1 milliGRAM(s) IntraMuscular once PRN Glucose LESS THAN 70 milligrams/deciliter  ondansetron Injectable 4 milliGRAM(s) IV Push every 6 hours PRN Nausea      PAST MEDICAL & SURGICAL HISTORY:  Scoliosis  CAD (coronary artery disease): h/o PCI with stenting x1  Spinal stenosis of lumbar region  Dyslipidemia  H/O: iron deficiency anemia: h/o blood transfusion in 2002  Mild persistent asthma in adult without complication: last rescue inhaler used over 6 months ago  Snores  T2DM (type 2 diabetes mellitus): no daily fingerstick   last A1C  (10+) as per patient  H/O: HTN (hypertension): on medication  History of myocardial infarction: h/o upper back and left arm pain prompted an angioplasty with stenting x 1 artery in 2000  last echo/ ekg April 2017  H/O colonoscopy  History of cholecystectomy      REVIEW OF SYSTEMS  All review of systems negative, except for those marked:  General:	no headache  Eyes:			none  ENT:			none  Pulmonary:		+ wheezing, resolved  Cardiac:		no chest pain  Gastrointestinal:	 constipation  Renal/Urologic:		none  Musculoskeletal:	 none  Skin:		+rash, resolved  Neurologic:	none	  Psychiatric:	none	  Endocrine:	+history of DM		  Allergy/Immune:	 history of drug allergy    SOCIAL/ENVIRONMENTAL HISTORY:  Tobacco	never smoker      FAMILY HISTORY:  No pertinent family medical history  Allergies		[x] No	[] Yes:   Asthma			[x] No	[] Yes:  Immune Deficiency	[X] No	[] Yes:   Other:			[] No	[] Yes:    Vital Signs Last 24 Hrs  T(C): 36.5 (20 Nov 2017 15:00), Max: 36.9 (20 Nov 2017 04:55)  T(F): 97.7 (20 Nov 2017 15:00), Max: 98.5 (20 Nov 2017 04:55)  HR: 61 (20 Nov 2017 16:00) (57 - 81)  BP: 129/63 (20 Nov 2017 16:00) (115/73 - 162/74)  BP(mean): 83 (20 Nov 2017 16:00) (83 - 89)  RR: 18 (20 Nov 2017 16:00) (15 - 30)  SpO2: 99% (20 Nov 2017 16:00) (79% - 99%)    PHYSICAL EXAM  All physical exam findings normal, except for those marked:  General:	alert, no acute distress  Eyes      no conjunctival injection, no discharge, no photophobia, intact                 extraocular movements, sclera not icteric, no suborbital bogginess  ENT:    external ear normal, normal nasal mucosa  .		and turbinates without discharge, no pharyngeal erythema or exudates, no  .		cobblestoning, normal tongue and lips, normal tonsils   Neck    supple, full range of motion  Lymph Nodes	normal size and consistency, non-tender  Cardiovascular	regular rate and rhythm; Normal S1, S2; No murmur  Respiratory	good air movement bilaterally, no wheezing or crackles,  no retractions  Abdominal	soft; ND, NT, no hepatosplenomegaly, + bowel sounds  Skin		skin intact; no rash,   Neurologic	alert, appropriate for age, cranial nerves II-XII grossly normal  Musculoskeletal	 no joint swelling, erythema, or tenderness; full range of motion  .			with no contractures; no muscle tenderness; no clubbing; no cyanosis;  .			no edema    Lab Results:                        12.6   8.1   )-----------( 281      ( 20 Nov 2017 10:32 )             36.5     11-20    138  |  101  |  11  ----------------------------<  202<H>  4.1   |  21<L>  |  0.65    Ca    9.0      20 Nov 2017 10:32    TPro  7.0  /  Alb  3.9  /  TBili  1.4<H>  /  DBili  x   /  AST  99<H>  /  ALT  41  /  AlkPhos  95  11-20    LIVER FUNCTIONS - ( 20 Nov 2017 10:32 )  Alb: 3.9 g/dL / Pro: 7.0 g/dL / ALK PHOS: 95 U/L / ALT: 41 U/L RC / AST: 99 U/L / GGT: x                 IMAGING STUDIES: HPI:  61 year old female with h/o previous Myocardial Infarction s/p PCI with stenting (2000), Hypertension, Type 2 Diabetes Mellitus, dyslipidemia, mild intermittent asthma ( last exacerbation May 2017) , iron deficiency anemia, admitted via SDA for L4-S1 decompressive lumbar laminectomy for spinal stenosis.  On the morning of 11/20 patient suddenly developed hives, wheels, swelling of the left hand, SOB, wheezing, progressing to difficulty speaking full sentences, tongue swelling and facial rash.  Patient ate grapes, strawberries and blueberries, otherwise no new medications other than lactulose.  However, she had taken percocet less than 1 hour prior to the reaction.  Patient was placed on ventimask, received decadron 10 IVx1, pepcid, albuterol x1 and benadryl 50mg IV, and epinephrine.  Reaction subsided but patient was transferred to the NSCU for direct observation given the lack of clear inciting factor. The patient also reports that she was told by Surgical team that she had hives when she was in the Operating room.  Patient reports requiring an epi pen on several other occasions.  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  No inciting factor was identified.   2000: patient had episode of throat tightening x2, once after walking down a street fair which she attributes to smelling smoke from food being cooked, and once after smelling strong carpet cleaning supplies.    2005: patient reports that after eating crabmeat stuffed flounder she had swelling of face and difficulty breathing, she treated with benadryl with relief of symptoms.  Patient has eaten shellfish and fish since then, most recently ate same meal (crabmeat stuffed flounder) about 1.5 months ago.  May 2017: patient reports that she developed cough which within minutes turned into a coughing fit and she was unable to breathe, called EMS and was given Epipen, required CPAP at hospital.  She was diagnosed with asthma at this time.  However, she never had asthma prior to this time.  She reports that she follows with pulmonology but was told that her breathing tests do not look like asthma necessarily.     Overall, the patient has had 7-8 episodes of reactions that have included shortness of breath, feeling of lump in the throat, and distress with 3 of them associated with hives and or angioedema.  The symptoms are relieved with Benadryl and have required urgent care including epinephrine on several of the episodes.  There was a decrease in frequency of the episodes in the last 5 years until current hospitalization.     Drug allergies: patient reports an episode about 15 years ago when she got percocet, took 1 pill and then had diffuse itching, no rash at the time.  Does not think she has taken any prescription pain medications since that time outside of this hospital admission.  Here she tolerated dilaudid.   Patient reports that many years ago she was prescribed penicillin followed by a sulfa drug.  She took the penicillin x 5 days then had taken 2 days of the sulfa drug when she developed widespread itching and hives.  Has not taken penicillin, amoxicillin or sulfa drugs since that time.   She reports taking acetominophen and ibuprofen at home without any difficulties.    Food allergies: patient reports that she eats a varied diet including peanuts, treenuts, shellfish and fish.  She reports that aside from the one episode with crabmeat as listed above, she tolerates all shellfish.  However she does report itching of the hands if she comes in contact with the crab shells/skin.      Allergies    penicillin (Pruritus; Rash)  sulfa drugs (Pruritus; Rash)    Intolerances      MEDICATIONS  (STANDING):  amLODIPine   Tablet 10 milliGRAM(s) Oral daily  aspirin  chewable 81 milliGRAM(s) Oral daily  atorvastatin 20 milliGRAM(s) Oral at bedtime  dextrose 5%. 1000 milliLiter(s) (50 mL/Hr) IV Continuous <Continuous>  dextrose 50% Injectable 12.5 Gram(s) IV Push once  dextrose 50% Injectable 25 Gram(s) IV Push once  dextrose 50% Injectable 25 Gram(s) IV Push once  diphenhydrAMINE   Injectable 50 milliGRAM(s) IV Push every 4 hours  docusate sodium 100 milliGRAM(s) Oral three times a day  enoxaparin Injectable 40 milliGRAM(s) SubCutaneous daily  insulin lispro (HumaLOG) corrective regimen sliding scale   SubCutaneous three times a day before meals  insulin lispro (HumaLOG) corrective regimen sliding scale   SubCutaneous at bedtime  lisinopril 2.5 milliGRAM(s) Oral daily  methylPREDNISolone sodium succinate Injectable 40 milliGRAM(s) IV Push two times a day  metoprolol     tartrate 50 milliGRAM(s) Oral two times a day  montelukast 10 milliGRAM(s) Oral daily  polyethylene glycol 3350 17 Gram(s) Oral two times a day  senna 2 Tablet(s) Oral at bedtime  sodium chloride 0.9%. 1000 milliLiter(s) (20 mL/Hr) IV Continuous <Continuous>  tiotropium 18 MICROgram(s) Capsule 1 Capsule(s) Inhalation daily    MEDICATIONS  (PRN):  aluminum hydroxide/magnesium hydroxide/simethicone Suspension 30 milliLiter(s) Oral every 12 hours PRN Indigestion  dextrose Gel 1 Dose(s) Oral once PRN Blood Glucose LESS THAN 70 milliGRAM(s)/deciliter  glucagon  Injectable 1 milliGRAM(s) IntraMuscular once PRN Glucose LESS THAN 70 milligrams/deciliter  ondansetron Injectable 4 milliGRAM(s) IV Push every 6 hours PRN Nausea  Epinephrine autoinjector    PAST MEDICAL & SURGICAL HISTORY:  Scoliosis  CAD (coronary artery disease): h/o PCI with stenting x1  Spinal stenosis of lumbar region  Dyslipidemia  H/O: iron deficiency anemia: h/o blood transfusion in 2002  Mild persistent asthma in adult without complication: last rescue inhaler used over 6 months ago  Snores  T2DM (type 2 diabetes mellitus): no daily fingerstick   last A1C  (10+) as per patient  H/O: HTN (hypertension): on medication  History of myocardial infarction: h/o upper back and left arm pain prompted an angioplasty with stenting x 1 artery in 2000  last echo/ ekg April 2017  H/O colonoscopy  History of cholecystectomy      REVIEW OF SYSTEMS  All review of systems negative, except for those marked:  General:	no headache  Eyes:			none  ENT:			none  Pulmonary:		+ wheezing, resolved  Cardiac:		no chest pain  Gastrointestinal:	 constipation  Renal/Urologic:		none  Musculoskeletal:	 none  Skin:		+rash, resolved  Neurologic:	none	  Psychiatric:	none	  Endocrine:	+history of DM		  Allergy/Immune:	 history of drug allergy    SOCIAL/ENVIRONMENTAL HISTORY:  Tobacco	never smoker      FAMILY HISTORY:  No pertinent family medical history  Allergies		[x] No	[] Yes:   Asthma			[x] No	[] Yes:  Immune Deficiency	[X] No	[] Yes:   Other:			[] No	[] Yes:    Vital Signs Last 24 Hrs  T(C): 36.5 (20 Nov 2017 15:00), Max: 36.9 (20 Nov 2017 04:55)  T(F): 97.7 (20 Nov 2017 15:00), Max: 98.5 (20 Nov 2017 04:55)  HR: 61 (20 Nov 2017 16:00) (57 - 81)  BP: 129/63 (20 Nov 2017 16:00) (115/73 - 162/74)  BP(mean): 83 (20 Nov 2017 16:00) (83 - 89)  RR: 18 (20 Nov 2017 16:00) (15 - 30)  SpO2: 99% (20 Nov 2017 16:00) (79% - 99%)    PHYSICAL EXAM  All physical exam findings normal, except for those marked:  General:	alert, no acute distress  Eyes      no conjunctival injection, no discharge, no photophobia, intact                 extraocular movements, sclera not icteric, no suborbital bogginess  ENT:    external ear normal, normal nasal mucosa  .		and turbinates without discharge, no pharyngeal erythema or exudates, no  .		cobblestoning, normal tongue and lips, normal tonsils   Neck    supple, full range of motion  Lymph Nodes	normal size and consistency, non-tender  Cardiovascular	regular rate and rhythm; Normal S1, S2; No murmur  Respiratory	good air movement bilaterally, no wheezing or crackles,  no retractions  Abdominal	soft; ND, NT, no hepatosplenomegaly, + bowel sounds  Skin		skin intact; no rash,   Neurologic	alert, appropriate for age, cranial nerves II-XII grossly normal  Musculoskeletal	 no joint swelling, erythema, or tenderness; full range of motion  .			with no contractures; no muscle tenderness; no clubbing; no cyanosis;  .			no edema    Lab Results:                        12.6   8.1   )-----------( 281      ( 20 Nov 2017 10:32 )             36.5     11-20    138  |  101  |  11  ----------------------------<  202<H>  4.1   |  21<L>  |  0.65    Ca    9.0      20 Nov 2017 10:32    TPro  7.0  /  Alb  3.9  /  TBili  1.4<H>  /  DBili  x   /  AST  99<H>  /  ALT  41  /  AlkPhos  95  11-20    LIVER FUNCTIONS - ( 20 Nov 2017 10:32 )  Alb: 3.9 g/dL / Pro: 7.0 g/dL / ALK PHOS: 95 U/L / ALT: 41 U/L RC / AST: 99 U/L / GGT: x                 IMAGING STUDIES:

## 2017-11-20 NOTE — CHART NOTE - NSCHARTNOTEFT_GEN_A_CORE
unknown cause for anaphylactic reaction this morning.  pt. w/ plaque-like rash on dorsum of Right hand with sob and severe b/l exp. wheezing.  anaphylaxis treatments initiated by me immediately, hospitalist called to see pt.  pt. received, decadron, pepcid, benadryl, albuterol, epi IM with good response.  pt. with impending resp. failure treated successfully with resolution of resp. and airway complaints.  over an hour later, pt. with pruritus and rash with errythema/papular not macular this time on both  legs and lateral aspect of right arm, without any resp. or airway complaint and normal vitals.  ICU attending aware, will be transferred to [facility] instead of [facility] tele/pulse ox bed for observation.  private pulmonologist called to see pt. routinely, allergy service called for consult and will see pt. this  afternoon.  Hospitalist and ICU attending both saw patient at bedside.

## 2017-11-21 ENCOUNTER — TRANSCRIPTION ENCOUNTER (OUTPATIENT)
Age: 62
End: 2017-11-21

## 2017-11-21 VITALS — OXYGEN SATURATION: 100 %

## 2017-11-21 DIAGNOSIS — I25.10 ATHEROSCLEROTIC HEART DISEASE OF NATIVE CORONARY ARTERY WITHOUT ANGINA PECTORIS: ICD-10-CM

## 2017-11-21 DIAGNOSIS — Z13.29 ENCOUNTER FOR SCREENING FOR OTHER SUSPECTED ENDOCRINE DISORDER: ICD-10-CM

## 2017-11-21 DIAGNOSIS — Z88.1 ALLERGY STATUS TO OTHER ANTIBIOTIC AGENTS STATUS: ICD-10-CM

## 2017-11-21 LAB
ANION GAP SERPL CALC-SCNC: 15 MMOL/L — SIGNIFICANT CHANGE UP (ref 5–17)
BUN SERPL-MCNC: 10 MG/DL — SIGNIFICANT CHANGE UP (ref 7–23)
CALCIUM SERPL-MCNC: 9.2 MG/DL — SIGNIFICANT CHANGE UP (ref 8.4–10.5)
CHLORIDE SERPL-SCNC: 103 MMOL/L — SIGNIFICANT CHANGE UP (ref 96–108)
CO2 SERPL-SCNC: 22 MMOL/L — SIGNIFICANT CHANGE UP (ref 22–31)
CREAT SERPL-MCNC: 0.62 MG/DL — SIGNIFICANT CHANGE UP (ref 0.5–1.3)
GLUCOSE BLDC GLUCOMTR-MCNC: 214 MG/DL — HIGH (ref 70–99)
GLUCOSE SERPL-MCNC: 249 MG/DL — HIGH (ref 70–99)
MAGNESIUM SERPL-MCNC: 1.8 MG/DL — SIGNIFICANT CHANGE UP (ref 1.6–2.6)
PHOSPHATE SERPL-MCNC: 3.8 MG/DL — SIGNIFICANT CHANGE UP (ref 2.5–4.5)
POTASSIUM SERPL-MCNC: 4.4 MMOL/L — SIGNIFICANT CHANGE UP (ref 3.5–5.3)
POTASSIUM SERPL-SCNC: 4.4 MMOL/L — SIGNIFICANT CHANGE UP (ref 3.5–5.3)
SODIUM SERPL-SCNC: 140 MMOL/L — SIGNIFICANT CHANGE UP (ref 135–145)
TRYPTASE SERPL-MCNC: 19.1 NG/ML — HIGH

## 2017-11-21 PROCEDURE — C1889: CPT

## 2017-11-21 PROCEDURE — 94640 AIRWAY INHALATION TREATMENT: CPT

## 2017-11-21 PROCEDURE — 83735 ASSAY OF MAGNESIUM: CPT

## 2017-11-21 PROCEDURE — 82962 GLUCOSE BLOOD TEST: CPT

## 2017-11-21 PROCEDURE — 72020 X-RAY EXAM OF SPINE 1 VIEW: CPT

## 2017-11-21 PROCEDURE — 83520 IMMUNOASSAY QUANT NOS NONAB: CPT

## 2017-11-21 PROCEDURE — 97116 GAIT TRAINING THERAPY: CPT

## 2017-11-21 PROCEDURE — 97161 PT EVAL LOW COMPLEX 20 MIN: CPT

## 2017-11-21 PROCEDURE — 99233 SBSQ HOSP IP/OBS HIGH 50: CPT

## 2017-11-21 PROCEDURE — 80053 COMPREHEN METABOLIC PANEL: CPT

## 2017-11-21 PROCEDURE — 97530 THERAPEUTIC ACTIVITIES: CPT

## 2017-11-21 PROCEDURE — 99223 1ST HOSP IP/OBS HIGH 75: CPT | Mod: GC

## 2017-11-21 PROCEDURE — 84100 ASSAY OF PHOSPHORUS: CPT

## 2017-11-21 PROCEDURE — 80048 BASIC METABOLIC PNL TOTAL CA: CPT

## 2017-11-21 PROCEDURE — 85027 COMPLETE CBC AUTOMATED: CPT

## 2017-11-21 RX ORDER — INSULIN LISPRO 100/ML
VIAL (ML) SUBCUTANEOUS
Qty: 0 | Refills: 0 | Status: DISCONTINUED | OUTPATIENT
Start: 2017-11-21 | End: 2017-11-21

## 2017-11-21 RX ORDER — DIPHENHYDRAMINE HCL 50 MG
50 CAPSULE ORAL EVERY 6 HOURS
Qty: 0 | Refills: 0 | Status: DISCONTINUED | OUTPATIENT
Start: 2017-11-21 | End: 2017-11-21

## 2017-11-21 RX ORDER — EPINEPHRINE 0.3 MG/.3ML
0.3 INJECTION INTRAMUSCULAR; SUBCUTANEOUS ONCE
Qty: 0 | Refills: 0 | Status: DISCONTINUED | OUTPATIENT
Start: 2017-11-21 | End: 2017-11-21

## 2017-11-21 RX ORDER — MONTELUKAST 4 MG/1
1 TABLET, CHEWABLE ORAL
Qty: 0 | Refills: 0 | COMMUNITY

## 2017-11-21 RX ORDER — EPINEPHRINE 0.3 MG/.3ML
0.3 INJECTION INTRAMUSCULAR; SUBCUTANEOUS
Qty: 1 | Refills: 0 | OUTPATIENT
Start: 2017-11-21 | End: 2017-11-22

## 2017-11-21 RX ORDER — ACETAMINOPHEN 500 MG
2 TABLET ORAL
Qty: 0 | Refills: 0 | COMMUNITY
Start: 2017-11-21

## 2017-11-21 RX ADMIN — POLYETHYLENE GLYCOL 3350 17 GRAM(S): 17 POWDER, FOR SOLUTION ORAL at 05:23

## 2017-11-21 RX ADMIN — SODIUM CHLORIDE 20 MILLILITER(S): 9 INJECTION INTRAMUSCULAR; INTRAVENOUS; SUBCUTANEOUS at 11:40

## 2017-11-21 RX ADMIN — LISINOPRIL 2.5 MILLIGRAM(S): 2.5 TABLET ORAL at 09:11

## 2017-11-21 RX ADMIN — Medication 1 ENEMA: at 09:10

## 2017-11-21 RX ADMIN — TIOTROPIUM BROMIDE 1 CAPSULE(S): 18 CAPSULE ORAL; RESPIRATORY (INHALATION) at 12:20

## 2017-11-21 RX ADMIN — Medication 50 MILLIGRAM(S): at 05:23

## 2017-11-21 RX ADMIN — Medication 4: at 09:17

## 2017-11-21 RX ADMIN — Medication 100 MILLIGRAM(S): at 05:24

## 2017-11-21 RX ADMIN — AMLODIPINE BESYLATE 10 MILLIGRAM(S): 2.5 TABLET ORAL at 11:36

## 2017-11-21 RX ADMIN — Medication 40 MILLIGRAM(S): at 05:23

## 2017-11-21 RX ADMIN — Medication 50 MILLIGRAM(S): at 02:47

## 2017-11-21 NOTE — DISCHARGE NOTE ADULT - ADDITIONAL INSTRUCTIONS
Call Dr. Moran's office for follow up appointment  Call Allergist office for follow up appointment (Dr. Joshi, Dr. Ontiveros)

## 2017-11-21 NOTE — DISCHARGE NOTE ADULT - NS AS ACTIVITY OBS
Do not drive or operate machinery/Stairs allowed/No Heavy lifting/straining/Walking-Indoors allowed/Bathing allowed

## 2017-11-21 NOTE — DISCHARGE NOTE ADULT - CARE PROVIDER_API CALL
Rory Moran (MD), Neurological Surgery  [address] [place] [address] [id number]  [place] [address]  Phone: [phone]  Fax: [phone]

## 2017-11-21 NOTE — DISCHARGE NOTE ADULT - CARE PLAN
Principal Discharge DX:	Spinal stenosis of lumbar region with neurogenic claudication  Goal:	keep wound clean and dry  Instructions for follow-up, activity and diet:	no heavy lifting, bending, twisting,   keep wound clean and dry  call Dr. Moran's office if worsening pain  Secondary Diagnosis:	Anaphylaxis, initial encounter  Goal:	Call to make appointment with allergist  Instructions for follow-up, activity and diet:	Cont Prednisone 20mg daily for 2 more days   Avoid penicillins and sulfa medications  Follow up outpatient for possible skin testing.   Epipen as needed for allergic reaction, hives  Call EMS/911 immediately for any sign of rash, allergic reaction, difficulty breathing  Benadryl as needed  Secondary Diagnosis:	CAD (coronary artery disease)  Goal:	continue aspirin  Instructions for follow-up, activity and diet:	Make follow-up appointment with PMD, cardiologist  Secondary Diagnosis:	H/O: HTN (hypertension)  Goal:	continue home meds, follow up with PMD  Secondary Diagnosis:	Mild persistent asthma in adult without complication  Goal:	continue spiriva for asthma  Instructions for follow-up, activity and diet:	follow up with Pulmonologist  Secondary Diagnosis:	T2DM (type 2 diabetes mellitus)  Goal:	continue home meds  Instructions for follow-up, activity and diet:	f/up with endocrinologist

## 2017-11-21 NOTE — DISCHARGE NOTE ADULT - HOSPITAL COURSE
61 year old female with h/o previous MI s/p PCI with stenting (2000), HTN, T2DM, Dyslipidemia, mild intermittent asthma ( last exacerbation May 2017) , iron deficiency anemia.  S/P L4-S1 lami on 11/17/17.  On the morning of 11/20 patient suddenly developed hives, wheels, swelling of the left hand, SOB, wheezing, progressing to difficulty speaking full sentences, tongue swelling and facial rash.  Patient ate grapes from cafeteria, otherwise no new medications other than lactulose.  Placed on ventimask, received decadron 10 IVx1, pepcid, albuterol x1 and benadryl 50mg IV, and epinephrine.  Reaction subsided but patient was transferred to the NSCU for direct observation given the lack of clear inciting factor. The patient was also noted to have hives when she was in the Operating room.  Allergy work up has already been started, to follow up in office.  Avoid penicillins and sulfa medications.

## 2017-11-21 NOTE — DISCHARGE NOTE ADULT - MEDICATION SUMMARY - MEDICATIONS TO TAKE
I will START or STAY ON the medications listed below when I get home from the hospital:    Rolling Walker  -- Use as directed  Dx: Spinal stenosis, s/p L4-S1 Laminectomy  -- Indication: For walking assistance    predniSONE 20 mg oral tablet  -- 1 tab(s) by mouth once a day  -- Indication: For Prophylactic measure    acetaminophen 325 mg oral tablet  -- 2 tab(s) by mouth every 6 hours, As needed, Mild Pain (1 - 3)  -- Indication: For Pain    aspirin 81 mg oral delayed release tablet  -- 1 tab(s) by mouth once a day  -- Indication: For Coronary artery disease involving native coronary artery of native heart without angina pectoris    lisinopril 2.5 mg oral tablet  -- 1 tab(s) by mouth once a day  -- Indication: For high blood pressure    glipiZIDE 10 mg oral tablet  -- 1 tab(s) by mouth once a day  -- Indication: For Diabetes    metFORMIN 1000 mg oral tablet  -- 1 tab(s) by mouth 2 times a day  -- Indication: For Diabetes    rosuvastatin 5 mg oral tablet  -- 1 tab(s) by mouth once a day (at bedtime)  -- Indication: For high cholesterol    metoprolol tartrate 50 mg oral tablet  -- 1 tab(s) by mouth 2 times a day  -- Indication: For high blood pressure    Spiriva Respimat 2.5 mcg/inh inhalation aerosol  -- 2 puff(s) inhaled once a day  -- Indication: For Mild persistent asthma in adult without complication    amLODIPine 10 mg oral tablet  -- 1 tab(s) by mouth once a day  -- Indication: For high blood pressure    EpiPen 2-[place] 0.3 mg injectable kit  -- 0.3 milligram(s) injectable 1 to 2 times a day   -- Obtain medical advice before taking any non-prescription drugs as some may affect the action of this medication.    -- Indication: For in case of allergic reaction

## 2017-11-21 NOTE — DISCHARGE NOTE ADULT - SECONDARY DIAGNOSIS.
Anaphylaxis, initial encounter CAD (coronary artery disease) H/O: HTN (hypertension) Mild persistent asthma in adult without complication T2DM (type 2 diabetes mellitus)

## 2017-11-21 NOTE — PROGRESS NOTE ADULT - SUBJECTIVE AND OBJECTIVE BOX
HPI: 61 year old female with h/o previous MI s/p PCI with stenting (2000), HTN, T2DM, Dyslipidemia, mild intermittent asthma ( last exacerbation May 2017) , iron deficiency anemia, admitted via SDA for L4-S1 decompressive lumbar laminectomy for spinal stenosis.    s/p L4-S1 laminectomy POD1    Overnight events: Transferred to floor from PACU    PAST MEDICAL & SURGICAL HISTORY:  Scoliosis  CAD (coronary artery disease): h/o PCI with stenting x1  Spinal stenosis of lumbar region  Dyslipidemia  H/O: iron deficiency anemia: h/o blood transfusion in 2002  Mild persistent asthma in adult without complication: last rescue inhaler used over 6 months ago  Snores  T2DM (type 2 diabetes mellitus): no daily fingerstick   last A1C  (10+) as per patient  H/O: HTN (hypertension): on medication  History of myocardial infarction: h/o upper back and left arm pain prompted an angioplasty with stenting x 1 artery in 2000  last echo/ ekg April 2017  H/O colonoscopy  History of cholecystectomy    Vital Signs Last 24 Hrs  T(C): 36.6 (18 Nov 2017 05:20), Max: 37.6 (17 Nov 2017 14:15)  T(F): 97.9 (18 Nov 2017 05:20), Max: 99.7 (17 Nov 2017 14:15)  HR: 64 (18 Nov 2017 05:20) (57 - 99)  BP: 121/74 (18 Nov 2017 05:20) (97/63 - 131/78)  BP(mean): 74 (17 Nov 2017 17:00) (74 - 85)  RR: 18 (18 Nov 2017 05:20) (12 - 19)  SpO2: 95% (18 Nov 2017 05:20) (93% - 100%)                          14.2   8.3   )-----------( 292      ( 17 Nov 2017 12:11 )             40.0    11-17    142  |  106  |  11  ----------------------------<  231<H>  3.5   |  22  |  0.53    Ca    7.9<L>      17 Nov 2017 12:11       DRAIN OUTPUT:     NEUROIMAGING:     PHYSICAL EXAM:    Constitutional: No Acute Distress     Neurological: AOx3, Following Commands, Moving all Extremities     Motor exam:          Upper extremity                         Delt     Bicep     Tricep    HG                                                 R         5/5        5/5        5/5       5/5                                               L          5/5        5/5        5/5       5/5          Lower extremity                        HF         KF        KE       DF         PF                                                  R        5/5        5/5        5/5       5/5         5/5                                               L         5/5        5/5       5/5       5/5          5/5                                                 Sensation: [x] intact to light touch  [] decreased:     Pulmonary: Clear to Auscultation, No rales, No rhonchi, No wheezes     Cardiovascular: S1, S2, Regular rate and rhythm     Gastrointestinal: Soft, Non-tender, Non-distended     Extremities: No calf tenderness     Incision: dressing c/d/i    MEDICATIONS:   Antibiotics:    Neuro:  diazepam    Tablet 5 milliGRAM(s) Oral every 6 hours PRN muscle spasms  HYDROmorphone  Injectable 0.5 milliGRAM(s) IV Push every 6 hours PRN Severe Pain (7 - 10)  ondansetron Injectable 4 milliGRAM(s) IV Push every 6 hours PRN Nausea  oxyCODONE    IR 5 milliGRAM(s) Oral every 4 hours PRN Moderate Pain (4 - 6)    Anticoagulation:  aspirin  chewable 81 milliGRAM(s) Oral daily    Cardiology:  amLODIPine   Tablet 10 milliGRAM(s) Oral daily  lisinopril 2.5 milliGRAM(s) Oral daily  metoprolol     tartrate 50 milliGRAM(s) Oral two times a day    Endo:   atorvastatin 20 milliGRAM(s) Oral at bedtime  dexamethasone  Injectable 4 milliGRAM(s) IV Push every 6 hours  dextrose 50% Injectable 12.5 Gram(s) IV Push once  dextrose 50% Injectable 25 Gram(s) IV Push once  dextrose 50% Injectable 25 Gram(s) IV Push once  dextrose Gel 1 Dose(s) Oral once PRN  glucagon  Injectable 1 milliGRAM(s) IntraMuscular once PRN  insulin lispro (HumaLOG) corrective regimen sliding scale   SubCutaneous three times a day before meals  insulin lispro (HumaLOG) corrective regimen sliding scale   SubCutaneous at bedtime    Pulm:  ALBUTerol/ipratropium for Nebulization. 3 milliLiter(s) Nebulizer once PRN  diphenhydrAMINE   Injectable 50 milliGRAM(s) IV Push every 4 hours  montelukast 10 milliGRAM(s) Oral daily  tiotropium 18 MICROgram(s) Capsule 1 Capsule(s) Inhalation daily    GI/:  aluminum hydroxide/magnesium hydroxide/simethicone Suspension 30 milliLiter(s) Oral every 12 hours PRN  docusate sodium 100 milliGRAM(s) Oral three times a day  magnesium hydroxide Suspension 30 milliLiter(s) Oral every 12 hours PRN  senna 2 Tablet(s) Oral at bedtime    Other:  dextrose 5%. 1000 milliLiter(s) IV Continuous <Continuous>
Patient Seen and Examined.     Overnight Events:   None    T(C): 36.7 (11-21-17 @ 03:00), Max: 36.9 (11-20-17 @ 04:55)  HR: 55 (11-21-17 @ 03:00) (55 - 81)  BP: 138/69 (11-21-17 @ 02:00) (115/73 - 162/74)  RR: 16 (11-21-17 @ 03:00) (15 - 30)  SpO2: 96% (11-21-17 @ 03:00) (79% - 99%)    Exam:   Awake, Alert, AOX3  PERRL, EOMI, Face equal, Tongue m/l  5/5 throughout, no drift  SILT    acetaminophen   Tablet. 650 milliGRAM(s) Oral every 6 hours PRN  aluminum hydroxide/magnesium hydroxide/simethicone Suspension 30 milliLiter(s) Oral every 12 hours PRN  amLODIPine   Tablet 10 milliGRAM(s) Oral daily  aspirin  chewable 81 milliGRAM(s) Oral daily  atorvastatin 20 milliGRAM(s) Oral at bedtime  dextrose 5%. 1000 milliLiter(s) IV Continuous <Continuous>  dextrose 50% Injectable 12.5 Gram(s) IV Push once  dextrose 50% Injectable 25 Gram(s) IV Push once  dextrose 50% Injectable 25 Gram(s) IV Push once  dextrose Gel 1 Dose(s) Oral once PRN  diphenhydrAMINE   Injectable 50 milliGRAM(s) IV Push every 4 hours  docusate sodium 100 milliGRAM(s) Oral three times a day  enoxaparin Injectable 40 milliGRAM(s) SubCutaneous daily  glucagon  Injectable 1 milliGRAM(s) IntraMuscular once PRN  insulin lispro (HumaLOG) corrective regimen sliding scale   SubCutaneous Before meals and at bedtime  lisinopril 2.5 milliGRAM(s) Oral daily  methylPREDNISolone sodium succinate Injectable 40 milliGRAM(s) IV Push two times a day  metoprolol     tartrate 50 milliGRAM(s) Oral two times a day  montelukast 10 milliGRAM(s) Oral daily  ondansetron Injectable 4 milliGRAM(s) IV Push every 6 hours PRN  polyethylene glycol 3350 17 Gram(s) Oral two times a day  senna 2 Tablet(s) Oral at bedtime  sodium chloride 0.9%. 1000 milliLiter(s) IV Continuous <Continuous>  tiotropium 18 MICROgram(s) Capsule 1 Capsule(s) Inhalation daily                            11.9   6.1   )-----------( 267      ( 20 Nov 2017 23:35 )             34.6     11-20    140  |  103  |  10  ----------------------------<  249<H>  4.4   |  22  |  0.62    Ca    9.2      20 Nov 2017 23:35  Phos  3.8     11-20  Mg     1.8     11-20    TPro  7.0  /  Alb  3.9  /  TBili  1.4<H>  /  DBili  x   /  AST  99<H>  /  ALT  41  /  AlkPhos  95  11-20        Imaging:
Evaluation on behalf of Dr. Salazar    [facility] DIVISION OF PULMONARY, CRITICAL CARE and SLEEP MEDICINE  PULMONARY PROGRESS NOTE  FOR ANY QUESTIONS PLEASE CALL [phone] MONDAY - FRIDAY 8a-5p or [phone] on NIGHTS/WEEKENDS/HOLIDAYS    PATIENT INFORMATION:  NAME: MARIZA CONN:  MRN: [id number]    CHIEF COMPLAINT: Patient is a 61y old  Female who presents with a chief complaint of     [x] INITIAL CONSULT, H&P, FAMILY HISTORY and PAST MEDICAL AND SURGICAL HISTORY REVIEWED    OVERNIGHT EVENTS or CHANGES TO HPI: No overnight events - being monitored in NSCU. No respiratory distress. No shortness of breath or hypoxia. Overall pain improved. Allergy evaluation yesterday evening noted.    ========================REVIEW OF SYSTEMS========================  CONSTITUTIONAL: Feels well  CARDIOVASCULAR: Denies chest pain or palpitations  PULMONARY: No cough, wheezing or hemoptysis  [x] REMAINING REVIEW OF SYSTEMS NEGATIVE  [] UNABLE TO OBTAIN REVIEW OF SYSTEMS DUE TO _______________    ========================MEDICATIONS=============================  MEDICATIONS  (STANDING):  amLODIPine   Tablet 10 milliGRAM(s) Oral daily  aspirin  chewable 81 milliGRAM(s) Oral daily  atorvastatin 20 milliGRAM(s) Oral at bedtime  dextrose 5%. 1000 milliLiter(s) (50 mL/Hr) IV Continuous <Continuous>  dextrose 50% Injectable 12.5 Gram(s) IV Push once  dextrose 50% Injectable 25 Gram(s) IV Push once  dextrose 50% Injectable 25 Gram(s) IV Push once  diphenhydrAMINE   Injectable 50 milliGRAM(s) IV Push every 4 hours  docusate sodium 100 milliGRAM(s) Oral three times a day  enoxaparin Injectable 40 milliGRAM(s) SubCutaneous daily  insulin lispro (HumaLOG) corrective regimen sliding scale   SubCutaneous Before meals and at bedtime  lisinopril 2.5 milliGRAM(s) Oral daily  metoprolol     tartrate 50 milliGRAM(s) Oral two times a day  montelukast 10 milliGRAM(s) Oral daily  polyethylene glycol 3350 17 Gram(s) Oral two times a day  senna 2 Tablet(s) Oral at bedtime  sodium biphosphate Rectal Enema 1 Enema Rectal once  sodium chloride 0.9%. 1000 milliLiter(s) (20 mL/Hr) IV Continuous <Continuous>  tiotropium 18 MICROgram(s) Capsule 1 Capsule(s) Inhalation daily      MEDICATIONS  (PRN):  acetaminophen   Tablet. 650 milliGRAM(s) Oral every 6 hours PRN Mild Pain (1 - 3)  aluminum hydroxide/magnesium hydroxide/simethicone Suspension 30 milliLiter(s) Oral every 12 hours PRN Indigestion  dextrose Gel 1 Dose(s) Oral once PRN Blood Glucose LESS THAN 70 milliGRAM(s)/deciliter  glucagon  Injectable 1 milliGRAM(s) IntraMuscular once PRN Glucose LESS THAN 70 milligrams/deciliter  ondansetron Injectable 4 milliGRAM(s) IV Push every 6 hours PRN Nausea      ========================PHYSICAL EXAM============================    VITALS: ICU Vital Signs Last 24 Hrs  T(C): 36.9 (21 Nov 2017 07:00), Max: 36.9 (20 Nov 2017 23:00)  T(F): 98.4 (21 Nov 2017 07:00), Max: 98.4 (20 Nov 2017 23:00)  HR: 60 (21 Nov 2017 07:00) (55 - 81)  BP: 141/68 (21 Nov 2017 07:00) (115/73 - 162/74)  BP(mean): 89 (21 Nov 2017 07:00) (74 - 112)  ABP: --  ABP(mean): --  RR: 17 (21 Nov 2017 07:00) (15 - 30)  SpO2: 96% (21 Nov 2017 07:00) (79% - 99%)      INTAKE and OUTPUT: I&O's Summary    20 Nov 2017 07:01  -  21 Nov 2017 07:00  --------------------------------------------------------  IN: 1220 mL / OUT: 1640 mL / NET: -420 mL    VENTILATOR SETTINGS: N/A    GENERAL: AAOx3, NAD, comfortable in bed  EYES: anicteric, EOMI  EAR/NOSE/MOUTH/THROAT: normocephalic, atraumatic, nares clear, MMM, trachea midline  NECK: supple, no JVD  LYMPH NODES: no palpable supraclavicular LAD  CARDIOVASCULAR: RRR, S1S2  RESPIRATORY: CTA bilaterally, no wheeze or rales  ABDOMEN: soft, NT, ND, +BS  EXTREMITIES: no clubbing or cyanosis  SKIN: warm and dry  MUSCULOSKELETAL: normal strength   NEUROLOGIC: moves all extremities, nonfocal  PSYCHIATRIC: calm and in good spirtis    ========================LABORATORY RESULTS AND IMAGING=============                        11.9   6.1   )-----------( 267      ( 20 Nov 2017 23:35 )             34.6                                                    11-20    140  |  103  |  10  ----------------------------<  249<H>  4.4   |  22  |  0.62    Ca    9.2      20 Nov 2017 23:35  Phos  3.8     11-20  Mg     1.8     11-20    TPro  7.0  /  Alb  3.9  /  TBili  1.4<H>  /  DBili  x   /  AST  99<H>  /  ALT  41  /  AlkPhos  95  11-20          Creatinine Trend: 0.62<--, 0.65<--, 0.53<--    CT CHEST:     [] RADIOLOGY REVIEWED AND INTERPRETED BY ME      THANK YOU FOR ALLOWING US TO PARTICIPATE IN THE CARE OF THIS PATIENT
HPI:  61 year old female with h/o previous MI s/p PCI with stenting (2000), HTN, T2DM, Dyslipidemia, mild intermittent asthma ( last exacerbation May 2017) , iron deficiency anemia, admitted via SDA for L4-S1 decompressive lumbar laminectomy for spinal stenosis.  On the morning of 11/20 patient suddenly developed hives, wheels, swelling of the left hand, SOB, wheezing, progressing to difficulty speaking full sentences, tongue swelling and facial rash.  Patient ate grapes from cafeteria, otherwise no new medications other than lactulose.  Placed on ventimask, received decadron 10 IVx1, pepcid, albuterol x1 and benadryl 50mg IV, and epinephrine.  Reaction subsided but patient was transferred to the NSCU for direct observation given the lack of clear inciting factor.    OVERNIGHT EVENTS:   No acute events overnight.    VITALS:  T(C): , Max: 36.9 (11-20-17 @ 23:00)  HR:  (55 - 81)  BP:  (115/73 - 162/74)  ABP: --  RR:  (15 - 30)  SpO2:  (79% - 99%)  Wt(kg): --      LABS:  Na: 140 (11-20 @ 23:35), 138 (11-20 @ 10:32)  K: 4.4 (11-20 @ 23:35), 4.1 (11-20 @ 10:32)  Cl: 103 (11-20 @ 23:35), 101 (11-20 @ 10:32)  CO2: 22 (11-20 @ 23:35), 21 (11-20 @ 10:32)  BUN: 10 (11-20 @ 23:35), 11 (11-20 @ 10:32)  Cr: 0.62 (11-20 @ 23:35), 0.65 (11-20 @ 10:32)  Glu:     Hgb: 11.9 (11-20 @ 23:35), 12.6 (11-20 @ 10:32)  Hct: 34.6 (11-20 @ 23:35), 36.5 (11-20 @ 10:32)  WBC: 6.1 (11-20 @ 23:35), 8.1 (11-20 @ 10:32)  Plt: 267 (11-20 @ 23:35), 281 (11-20 @ 10:32)      IMAGING:   Recent imaging studies were reviewed.    MEDICATIONS:  acetaminophen   Tablet. 650 milliGRAM(s) Oral every 6 hours PRN  aluminum hydroxide/magnesium hydroxide/simethicone Suspension 30 milliLiter(s) Oral every 12 hours PRN  amLODIPine   Tablet 10 milliGRAM(s) Oral daily  aspirin  chewable 81 milliGRAM(s) Oral daily  atorvastatin 20 milliGRAM(s) Oral at bedtime  dextrose 5%. 1000 milliLiter(s) IV Continuous <Continuous>  dextrose 50% Injectable 12.5 Gram(s) IV Push once  dextrose 50% Injectable 25 Gram(s) IV Push once  dextrose 50% Injectable 25 Gram(s) IV Push once  dextrose Gel 1 Dose(s) Oral once PRN  diphenhydrAMINE   Injectable 50 milliGRAM(s) IV Push every 4 hours  docusate sodium 100 milliGRAM(s) Oral three times a day  enoxaparin Injectable 40 milliGRAM(s) SubCutaneous daily  glucagon  Injectable 1 milliGRAM(s) IntraMuscular once PRN  insulin lispro (HumaLOG) corrective regimen sliding scale   SubCutaneous Before meals and at bedtime  lisinopril 2.5 milliGRAM(s) Oral daily  methylPREDNISolone sodium succinate Injectable 40 milliGRAM(s) IV Push two times a day  metoprolol     tartrate 50 milliGRAM(s) Oral two times a day  montelukast 10 milliGRAM(s) Oral daily  ondansetron Injectable 4 milliGRAM(s) IV Push every 6 hours PRN  polyethylene glycol 3350 17 Gram(s) Oral two times a day  senna 2 Tablet(s) Oral at bedtime  sodium chloride 0.9%. 1000 milliLiter(s) IV Continuous <Continuous>  tiotropium 18 MICROgram(s) Capsule 1 Capsule(s) Inhalation daily    EXAMINATION:  General:  calm  HEENT:  MMM  Neuro:  awake, alert, oriented x 3, follows commands, moves all extremities  Cards:  RRR  Respiratory:  no respiratory distress  Adomen:  soft  Extremities:  moves all extremities  Skin:  warm/dry
HPI:  61 year old female with h/o previous MI s/p PCI with stenting (2000), HTN, T2DM, Dyslipidemia, mild intermittent asthma ( last exacerbation May 2017) , iron deficiency anemia, admitted via SDA for L4-S1 decompressive lumbar laminectomy for spinal stenosis.  On the morning of 11/20 patient suddenly developed hives, wheels, swelling of the left hand, SOB, wheezing, progressing to difficulty speaking full sentences, tongue swelling and facial rash.  Patient ate grapes from cafeteria, otherwise no new medications other than lactulose.  Placed on ventimask, received decadron 10 IVx1, pepcid, albuterol x1 and benadryl 50mg IV, and epinephrine.  Reaction subsided but patient was transferred to the NSCU for direct observation given the lack of clear inciting factor.    VITALS:  T(C): , Max: 36.9 (11-20-17 @ 04:55)  HR:  (57 - 81)  BP:  (115/73 - 162/74)  ABP: --  RR:  (15 - 30)  SpO2:  (79% - 99%)  Wt(kg): --      LABS:  Na: 138 (11-20 @ 10:32)  K: 4.1 (11-20 @ 10:32)  Cl: 101 (11-20 @ 10:32)  CO2: 21 (11-20 @ 10:32)  BUN: 11 (11-20 @ 10:32)  Cr: 0.65 (11-20 @ 10:32)  Glu:     Hgb: 12.6 (11-20 @ 10:32)  Hct: 36.5 (11-20 @ 10:32)  WBC: 8.1 (11-20 @ 10:32)  Plt: 281 (11-20 @ 10:32)      IMAGING:   Recent imaging studies were reviewed.    MEDICATIONS:  aluminum hydroxide/magnesium hydroxide/simethicone Suspension 30 milliLiter(s) Oral every 12 hours PRN  amLODIPine   Tablet 10 milliGRAM(s) Oral daily  aspirin  chewable 81 milliGRAM(s) Oral daily  atorvastatin 20 milliGRAM(s) Oral at bedtime  dextrose 5%. 1000 milliLiter(s) IV Continuous <Continuous>  dextrose 50% Injectable 12.5 Gram(s) IV Push once  dextrose 50% Injectable 25 Gram(s) IV Push once  dextrose 50% Injectable 25 Gram(s) IV Push once  dextrose Gel 1 Dose(s) Oral once PRN  diphenhydrAMINE   Injectable 50 milliGRAM(s) IV Push every 4 hours  docusate sodium 100 milliGRAM(s) Oral three times a day  enoxaparin Injectable 40 milliGRAM(s) SubCutaneous daily  glucagon  Injectable 1 milliGRAM(s) IntraMuscular once PRN  insulin lispro (HumaLOG) corrective regimen sliding scale   SubCutaneous three times a day before meals  insulin lispro (HumaLOG) corrective regimen sliding scale   SubCutaneous at bedtime  lisinopril 2.5 milliGRAM(s) Oral daily  methylPREDNISolone sodium succinate Injectable 40 milliGRAM(s) IV Push two times a day  metoprolol     tartrate 50 milliGRAM(s) Oral two times a day  montelukast 10 milliGRAM(s) Oral daily  ondansetron Injectable 4 milliGRAM(s) IV Push every 6 hours PRN  oxyCODONE    5 mG/acetaminophen 325 mG 1 Tablet(s) Oral every 4 hours PRN  oxyCODONE    5 mG/acetaminophen 325 mG 2 Tablet(s) Oral every 4 hours PRN  polyethylene glycol 3350 17 Gram(s) Oral two times a day  senna 2 Tablet(s) Oral at bedtime  sodium chloride 0.9%. 1000 milliLiter(s) IV Continuous <Continuous>  tiotropium 18 MICROgram(s) Capsule 1 Capsule(s) Inhalation daily    EXAMINATION:  General:  calm  HEENT:  MMM  Neuro:  awake, alert, oriented x 3, follows commands, moves all extremities  Cards:  RRR  Respiratory:  no respiratory distress  Adomen:  soft  Extremities:  moves all extremities  Skin:  warm/dry
HPI: 61 year old female with h/o previous MI s/p PCI with stenting (2000), HTN, T2DM, Dyslipidemia, mild intermittent asthma ( last exacerbation May 2017) , iron deficiency anemia, admitted via SDA for L4-S1 decompressive lumbar laminectomy for spinal stenosis.    s/p L4-S1 laminectomy POD# 2    Vital Signs Last 24 Hrs  T(C): 36.6 (19 Nov 2017 06:36), Max: 36.7 (18 Nov 2017 13:09)  T(F): 97.9 (19 Nov 2017 06:36), Max: 98 (18 Nov 2017 13:09)  HR: 74 (19 Nov 2017 06:36) (59 - 74)  BP: 113/67 (19 Nov 2017 06:36) (112/67 - 122/75)  BP(mean): --  RR: 18 (19 Nov 2017 06:36) (18 - 18)  SpO2: 95% (19 Nov 2017 06:36) (94% - 100%)                          14.2   8.3   )-----------( 292      ( 17 Nov 2017 12:11 )             40.0    11-17    142  |  106  |  11  ----------------------------<  231<H>  3.5   |  22  |  0.53    Ca    7.9<L>      17 Nov 2017 12:11       DRAIN OUTPUT: 140 cc    NEUROIMAGING:     PHYSICAL EXAM:    Constitutional: No Acute Distress     Neurological: AOx3, Following Commands, Moving all Extremities     Motor exam:          Upper extremity                         Delt     Bicep     Tricep    HG                                                 R         5/5        5/5        5/5       5/5                                               L          5/5        5/5        5/5       5/5          Lower extremity                        HF         KF        KE       DF         PF                                                  R        5/5        5/5        5/5       5/5         5/5                                               L         5/5        5/5       5/5       5/5          5/5                                                Incision: dressing c/d    MEDICATIONS:   Antibiotics:    Neuro:  diazepam    Tablet 5 milliGRAM(s) Oral every 6 hours PRN muscle spasms  HYDROmorphone  Injectable 0.5 milliGRAM(s) IV Push every 6 hours PRN Severe Pain (7 - 10)  ondansetron Injectable 4 milliGRAM(s) IV Push every 6 hours PRN Nausea  oxyCODONE    IR 5 milliGRAM(s) Oral every 4 hours PRN Moderate Pain (4 - 6)    Anticoagulation:  aspirin  chewable 81 milliGRAM(s) Oral daily    Cardiology:  amLODIPine   Tablet 10 milliGRAM(s) Oral daily  lisinopril 2.5 milliGRAM(s) Oral daily  metoprolol     tartrate 50 milliGRAM(s) Oral two times a day    Endo:   atorvastatin 20 milliGRAM(s) Oral at bedtime  dexamethasone  Injectable 4 milliGRAM(s) IV Push every 6 hours  dextrose 50% Injectable 12.5 Gram(s) IV Push once  dextrose 50% Injectable 25 Gram(s) IV Push once  dextrose 50% Injectable 25 Gram(s) IV Push once  dextrose Gel 1 Dose(s) Oral once PRN  glucagon  Injectable 1 milliGRAM(s) IntraMuscular once PRN  insulin lispro (HumaLOG) corrective regimen sliding scale   SubCutaneous three times a day before meals  insulin lispro (HumaLOG) corrective regimen sliding scale   SubCutaneous at bedtime    Pulm:  ALBUTerol/ipratropium for Nebulization. 3 milliLiter(s) Nebulizer once PRN  diphenhydrAMINE   Injectable 50 milliGRAM(s) IV Push every 4 hours  montelukast 10 milliGRAM(s) Oral daily  tiotropium 18 MICROgram(s) Capsule 1 Capsule(s) Inhalation daily    GI/:  aluminum hydroxide/magnesium hydroxide/simethicone Suspension 30 milliLiter(s) Oral every 12 hours PRN  docusate sodium 100 milliGRAM(s) Oral three times a day  magnesium hydroxide Suspension 30 milliLiter(s) Oral every 12 hours PRN  senna 2 Tablet(s) Oral at bedtime    Other:  dextrose 5%. 1000 milliLiter(s) IV Continuous <Continuous>
Post-Anesthetic Evaluation:    The Patient was interviewed and evaluated    Vital Signs Last 24 Hrs  T(C): 36.6 (18 Nov 2017 05:20), Max: 37.6 (17 Nov 2017 14:15)  T(F): 97.9 (18 Nov 2017 05:20), Max: 99.7 (17 Nov 2017 14:15)  HR: 64 (18 Nov 2017 05:20) (57 - 99)  BP: 121/74 (18 Nov 2017 05:20) (97/63 - 131/78)  BP(mean): 74 (17 Nov 2017 17:00) (74 - 85)  RR: 18 (18 Nov 2017 05:20) (12 - 19)  SpO2: 95% (18 Nov 2017 05:20) (93% - 100%)    Evaluation:      (X) No apparent complications or complaints regarding anesthesia care at this time  (X) All questions were answered    Condition:  (X) Stable      ( ) Guarded      ( ) Critical    Recommendations:  (X) None     ( ) Other:
SUBJECTIVE: pain    OVERNIGHT EVENTS: none    Vital Signs Last 24 Hrs  T(C): 36.9 (20 Nov 2017 04:55), Max: 36.9 (19 Nov 2017 09:45)  T(F): 98.5 (20 Nov 2017 04:55), Max: 98.5 (20 Nov 2017 04:55)  HR: 64 (20 Nov 2017 04:55) (60 - 76)  BP: 134/59 (20 Nov 2017 04:55) (119/72 - 137/76)  BP(mean): --  RR: 18 (20 Nov 2017 04:55) (18 - 18)  SpO2: 95% (20 Nov 2017 04:55) (94% - 99%)    DRAINS: 1 hmv    PHYSICAL EXAM:    Constitutional: No Acute Distress     Neurological: AOx3, Following Commands, Moving all Extremities     Motor exam:          Upper extremity                         Delt     Bicep     Tricep    HG                                                 R         5/5        5/5        5/5       5/5                                               L          5/5        5/5        5/5       5/5          Lower extremity                        HF         KF        KE       DF         PF                                                  R        4/5        4/5        4/5       5/5         5/5                                               L         4/5        4/5       4/5       5/5          5/5                                                 Sensation: []xxx intact to light touch  [] decreased:     Pulmonary: Clear to Auscultation, No rales, No rhonchi, No wheezes     Cardiovascular: S1, S2, Regular rate and rhythm     Gastrointestinal: Soft, Non-tender, Non-distended     Extremities: No calf tenderness     Incision:     LABS:           MEDICATIONS:  Antibiotics:    Neuro:  diazepam    Tablet 5 milliGRAM(s) Oral every 6 hours PRN muscle spasms  ondansetron Injectable 4 milliGRAM(s) IV Push every 6 hours PRN Nausea  oxyCODONE    5 mG/acetaminophen 325 mG 1 Tablet(s) Oral every 4 hours PRN Moderate Pain (4 - 6)  oxyCODONE    5 mG/acetaminophen 325 mG 2 Tablet(s) Oral every 4 hours PRN Severe Pain (7 - 10)    Cardiac:  amLODIPine   Tablet 10 milliGRAM(s) Oral daily  lisinopril 2.5 milliGRAM(s) Oral daily  metoprolol     tartrate 50 milliGRAM(s) Oral two times a day    Pulm:  montelukast 10 milliGRAM(s) Oral daily  tiotropium 18 MICROgram(s) Capsule 1 Capsule(s) Inhalation daily    GI/:  aluminum hydroxide/magnesium hydroxide/simethicone Suspension 30 milliLiter(s) Oral every 12 hours PRN Indigestion  docusate sodium 100 milliGRAM(s) Oral three times a day  lactulose Syrup 20 Gram(s) Oral once  polyethylene glycol 3350 17 Gram(s) Oral two times a day  senna 2 Tablet(s) Oral at bedtime    Other:   aspirin  chewable 81 milliGRAM(s) Oral daily  atorvastatin 20 milliGRAM(s) Oral at bedtime  dextrose 5%. 1000 milliLiter(s) IV Continuous <Continuous>  dextrose 50% Injectable 12.5 Gram(s) IV Push once  dextrose 50% Injectable 25 Gram(s) IV Push once  dextrose 50% Injectable 25 Gram(s) IV Push once  dextrose Gel 1 Dose(s) Oral once PRN Blood Glucose LESS THAN 70 milliGRAM(s)/deciliter  enoxaparin Injectable 40 milliGRAM(s) SubCutaneous daily  glucagon  Injectable 1 milliGRAM(s) IntraMuscular once PRN Glucose LESS THAN 70 milligrams/deciliter  insulin lispro (HumaLOG) corrective regimen sliding scale   SubCutaneous three times a day before meals  insulin lispro (HumaLOG) corrective regimen sliding scale   SubCutaneous at bedtime    DIET: [] Regular [] CCD [] Renal [] Puree [] Dysphagia [] Tube Feeds:     IMAGING:

## 2017-11-21 NOTE — PROGRESS NOTE ADULT - PROBLEM SELECTOR PLAN 1
-Allergy input noted  -avoid opiods  -discharge with EpiPen  -outpatient Allergy followup for further allergy testing  -would also consider discontinuing ACE inhibitor - although patient without angioedema her multiple anaphylactic presentations without a clear trigger does lead to some concern and the benefit of ACEi in setting of DM2 does not outweigh the risks of this as a possible anaphylactic trigger

## 2017-11-21 NOTE — PROGRESS NOTE ADULT - ASSESSMENT
61F POD4 from L4-S1 lami, course complicated by allergies/ anaphylaxis. Unknown trigger.   - Allergy/Immunology consult  - q. 1 hour neuro checks

## 2017-11-21 NOTE — DISCHARGE NOTE ADULT - PLAN OF CARE
keep wound clean and dry no heavy lifting, bending, twisting,   keep wound clean and dry  call Dr. Moran's office if worsening pain Call to make appointment with allergist Cont Prednisone 20mg daily for 2 more days   Avoid penicillins and sulfa medications  Follow up outpatient for possible skin testing.   Epipen as needed for allergic reaction, hives  Call EMS/911 immediately for any sign of rash, allergic reaction, difficulty breathing  Benadryl as needed continue aspirin Make follow-up appointment with PMD, cardiologist continue home meds, follow up with PMD continue spiriva for asthma follow up with Pulmonologist continue home meds f/up with endocrinologist

## 2017-11-21 NOTE — PROGRESS NOTE ADULT - ASSESSMENT
61F asthma (on Advair, Spiriva, Singulair), DM2, HTN, HLD who initially presented for S1 laminectomy for spinal stenosis and lumbar back pain with course complicated by allergic reaction and respiratory distress possibly due to mast cell disorder now clinically improved

## 2017-11-21 NOTE — DISCHARGE NOTE ADULT - MEDICATION SUMMARY - MEDICATIONS TO STOP TAKING
I will STOP taking the medications listed below when I get home from the hospital:    Shower Chair  -- Use as directed  Dx: spinal stenosos, s/p L4-S1 laminectomy

## 2017-11-21 NOTE — PROGRESS NOTE ADULT - ASSESSMENT
Summary: s/p L4-S1 laminectomy, experienced anaphylactoid response to unknown stimulus.  Patient admitted to NSCU for close monitoring.    NEURO:  q4h neurochecks    CARDS:  -150    PULM:  sat > 92%    RENAL:  IVL    GASTRO:  CCD  ---> Stress ulcer prophylaxis:  no     HEME:  monitor H/H w/diff and take note of eosinophil count    ---> DVT prophylaxis: SCDs, lovenox    ENDO:  euglycemia    ID:  afebrile    Code status:  Full code  Disposition:  ICU    This patient was at high risk of neurologic deterioration and/or death due to: anaphylaxis Summary: s/p L4-S1 laminectomy, experienced anaphylactoid response to unknown stimulus.  Patient admitted to NSCU for close monitoring.    NEURO:  q4h neurochecks    CARDS:  -150    PULM:  sat > 92%    RENAL:  IVL    GASTRO:  CCD  ---> Stress ulcer prophylaxis:  no     HEME:  allergy/immunology consulted    ---> DVT prophylaxis: SCDs, lovenox    ENDO:  euglycemia    ID:  afebrile    Code status:  Full code  Disposition:  ICU    This patient was at high risk of neurologic deterioration and/or death due to: anaphylaxis    Non critical care time 15m Summary: s/p L4-S1 laminectomy, experienced anaphylactoid response to unknown stimulus.  Patient admitted to NSCU for close monitoring.    NEURO:  q4h neurochecks    CARDS:  -150    PULM:  sat > 92%    RENAL:  IVL    GASTRO:  CCD  ---> Stress ulcer prophylaxis:  no     HEME:  allergy/immunology consulted, taper steroids and standing benadryl  ---> DVT prophylaxis: SCDs, lovenox    ENDO:  euglycemia    ID:  afebrile    Code status:  Full code  Disposition:  ICU    This patient was at high risk of neurologic deterioration and/or death due to: anaphylaxis    Non critical care time 15m Summary: s/p L4-S1 laminectomy, experienced anaphylactoid response to unknown stimulus.  Patient admitted to NSCU for close monitoring.    NEURO:  q4h neurochecks    CARDS:  -150    PULM:  sat > 92%    RENAL:  IVL    GASTRO:  CCD  ---> Stress ulcer prophylaxis:  no     HEME:  allergy/immunology consulted, taper steroids and standing benadryl  ---> DVT prophylaxis: SCDs, lovenox    ENDO:  euglycemia    ID:  afebrile    Code status:  Full code  Disposition:  floor

## 2017-11-21 NOTE — DISCHARGE NOTE ADULT - PATIENT PORTAL LINK FT
“You can access the [facility] Patient Portal, offered by [facility], by registering with the following website: [email or web address]”

## 2017-11-21 NOTE — PROGRESS NOTE ADULT - PROBLEM SELECTOR PLAN 2
-continue bronchodilators  -outpatient followup with Dr. Salazar  -stable from a pulmonary standpoint at the present time  -maintain O2 sat > 90  -incentive spirometer to prevent postop atelectasis  -OOB and ambulation

## 2017-11-27 ENCOUNTER — APPOINTMENT (OUTPATIENT)
Dept: SPINE | Facility: CLINIC | Age: 62
End: 2017-11-27
Payer: COMMERCIAL

## 2017-11-27 VITALS
HEART RATE: 62 BPM | WEIGHT: 187 LBS | DIASTOLIC BLOOD PRESSURE: 70 MMHG | SYSTOLIC BLOOD PRESSURE: 117 MMHG | BODY MASS INDEX: 34.41 KG/M2 | HEIGHT: 62 IN

## 2017-11-27 PROCEDURE — 99024 POSTOP FOLLOW-UP VISIT: CPT

## 2017-11-28 ENCOUNTER — INPATIENT (INPATIENT)
Facility: HOSPITAL | Age: 62
LOS: 2 days | Discharge: HOME | DRG: 558 | End: 2017-12-01
Attending: INTERNAL MEDICINE | Admitting: HOSPITALIST
Payer: COMMERCIAL

## 2017-11-28 VITALS
SYSTOLIC BLOOD PRESSURE: 137 MMHG | RESPIRATION RATE: 16 BRPM | TEMPERATURE: 99 F | HEIGHT: 62.5 IN | DIASTOLIC BLOOD PRESSURE: 69 MMHG | HEART RATE: 58 BPM | WEIGHT: 190.04 LBS

## 2017-11-28 DIAGNOSIS — S93.401A SPRAIN OF UNSPECIFIED LIGAMENT OF RIGHT ANKLE, INITIAL ENCOUNTER: ICD-10-CM

## 2017-11-28 DIAGNOSIS — Z98.890 OTHER SPECIFIED POSTPROCEDURAL STATES: Chronic | ICD-10-CM

## 2017-11-28 DIAGNOSIS — Z90.49 ACQUIRED ABSENCE OF OTHER SPECIFIED PARTS OF DIGESTIVE TRACT: Chronic | ICD-10-CM

## 2017-11-28 PROCEDURE — 99285 EMERGENCY DEPT VISIT HI MDM: CPT

## 2017-11-28 PROCEDURE — 73610 X-RAY EXAM OF ANKLE: CPT | Mod: 26,RT

## 2017-11-28 RX ORDER — IBUPROFEN 200 MG
600 TABLET ORAL ONCE
Qty: 0 | Refills: 0 | Status: COMPLETED | OUTPATIENT
Start: 2017-11-28 | End: 2017-11-28

## 2017-11-28 RX ORDER — ACETAMINOPHEN 500 MG
975 TABLET ORAL ONCE
Qty: 0 | Refills: 0 | Status: COMPLETED | OUTPATIENT
Start: 2017-11-28 | End: 2017-11-28

## 2017-11-28 RX ADMIN — Medication 600 MILLIGRAM(S): at 16:16

## 2017-11-28 RX ADMIN — Medication 975 MILLIGRAM(S): at 20:21

## 2017-11-28 NOTE — ED PROVIDER NOTE - MEDICAL DECISION MAKING DETAILS
Dr. Davison Note: r/o occult fx, pain and td localized only to soft tissue ankle, not the joint, not skin, not vascular.

## 2017-11-28 NOTE — CONSULT NOTE ADULT - ASSESSMENT
61F s/p L4-S1 laminectomy 11/18 p/w 3 days of right ankle pain     -Symptoms do not appear to be related to spine or recent surgery, no acute neurosurgical intervention indicated at this time  -Pain control  -Consider orthopedic eval  -May follow up as outpatient with Dr. christie after discharge

## 2017-11-28 NOTE — ED ADULT NURSE REASSESSMENT NOTE - NS ED NURSE REASSESS COMMENT FT1
Report received from Porsche VAZQUEZ MD at the bedside speaking to patient. Pt. was given dinner tray.  at bedside. VSS.

## 2017-11-28 NOTE — ED ADULT NURSE REASSESSMENT NOTE - NS ED NURSE REASSESS COMMENT FT1
Pt states she feels 'confused', pt able to give name and reason, unable to give year, gave correct season. Pt neurologically intact, speaking clearly, neurosensory intact, no limb drift, attending MD made aware and to reassess.

## 2017-11-28 NOTE — ED ADULT NURSE NOTE - OBJECTIVE STATEMENT
61 year old female complaining of right sided foot and ankle pain since Saturday. As per patient she is 10 days post laminectomy. Pt able to move leg, pain is most severe to palpating on the medial side of the ankle. Pt is A&O x 4, VSS, afebrile, ambulating independently. Pt denies fever, chills, NVD, SOB, or chest pain.  at bedside. PPP equal bilaterally, pt denies numbness or tingling below site of injury.

## 2017-11-28 NOTE — ED PROVIDER NOTE - PROGRESS NOTE DETAILS
Dr. Davison Note: pt reassessed, rectal temp normal at 99F, incision site of back looks well, no signs of infection.  Pt still just local td and swelling. BROOKE Watts MD: Rec'd signout on this pt who p/w ankle pain in context of recent laminectomy. Xray neg for fx. Pt attempted to ambulate with walker (as she does at her baseline) and she was unable to ambulate. Admitted for further w/u and for PT.

## 2017-11-28 NOTE — ED PROVIDER NOTE - ATTENDING CONTRIBUTION TO CARE
Pt with focal pain and tenderness medial ankle area, no obvious injury, has been walking gingerly for past weeks s/p back surgery.  Pt screams in pain with focal palpation of soft tissue just anterior to medial malleolus right.  No pain anywhere else, able to range motion ankle, knee, no calf td, no achilles td, no skin changes.

## 2017-11-28 NOTE — ED ADULT NURSE NOTE - PSH
H/O colonoscopy    History of cholecystectomy H/O colonoscopy    H/O laminectomy    History of cholecystectomy

## 2017-11-28 NOTE — CONSULT NOTE ADULT - SUBJECTIVE AND OBJECTIVE BOX
HPI: 61F s/p L4-S1 laminectomy 11/17 p/w 3 days of ankle pain. Patient states that her right ankle has become painful to touch and unable to bear weight. No trauma. Seen by Dr. Moran in office yesterday. No back pain or radiating pain. Recovered well since surgery.     PAST MEDICAL HISTORY   Scoliosis  CAD (coronary artery disease)  Spinal stenosis of lumbar region  Dyslipidemia  H/O: iron deficiency anemia  Mild persistent asthma in adult without complication  Snores  T2DM (type 2 diabetes mellitus)  H/O: HTN (hypertension)  History of myocardial infarction    PAST SURGICAL HISTORY   H/O laminectomy  H/O colonoscopy  History of cholecystectomy    penicillin (Pruritus; Rash)  Percocet 10/325 (Anaphylaxis)  sulfa drugs (Pruritus; Rash)      MEDICATIONS:  Antibiotics:    Neuro:    Anticoagulation: none    Other:      SOCIAL HISTORY:   Occupation:   Marital Status:     FAMILY HISTORY:      REVIEW OF SYSTEMS:  Check here if all are normal other than Neurological []  General:  Eyes:  ENT:  Cardiac:  Respiratory:  GI:  Musculoskeletal: ankle pain  Skin:  Neurologic:   Psychiatric:     PHYSICAL EXAMINATION:   T(C): 37 (11-28-17 @ 19:57), Max: 37.3 (11-28-17 @ 11:58)  HR: 64 (11-28-17 @ 19:57) (58 - 64)  BP: 126/68 (11-28-17 @ 19:57) (126/68 - 137/69)  RR: 16 (11-28-17 @ 19:57) (16 - 16)  SpO2: 99% (11-28-17 @ 19:57) (99% - 99%)  Wt(kg): --Height (cm): 158.75 (11-28 @ 11:58)  Weight (kg): 86.2 (11-28 @ 11:58)    PHYSICAL EXAM:    Constitutional: No Acute Distress     Neurological: AOx3, Following Commands    Motor exam:          Upper extremity                         Delt     Bicep     Tricep    HG                                                 R         5/5        5/5        5/5       5/5                                               L          5/5        5/5        5/5       5/5          Lower extremity                        HF         KF        KE       DF         PF                                                  R        5/5        5/5        5/5       5/5         5/5                                               L         5/5        5/5       5/5       5/5          5/5                                                 Sensation: [x] intact to light touch  [] decreased:     No clonus    Incision: well-healed    Right ankle tender to palpation especially medial aspect    LABS:                Pager: [phone]

## 2017-11-28 NOTE — ED ADULT NURSE NOTE - PMH
CAD (coronary artery disease)  h/o PCI with stenting x1  Dyslipidemia    H/O: HTN (hypertension)  on medication  H/O: iron deficiency anemia  h/o blood transfusion in 2002  History of myocardial infarction  h/o upper back and left arm pain prompted an angioplasty with stenting x 1 artery in 2000  last echo/ ekg April 2017  Mild persistent asthma in adult without complication  last rescue inhaler used over 6 months ago  Scoliosis    Snores    Spinal stenosis of lumbar region    T2DM (type 2 diabetes mellitus)  no daily fingerstick   last A1C  (10+) as per patient

## 2017-11-28 NOTE — ED PROVIDER NOTE - OBJECTIVE STATEMENT
62 yo F HLD, HTN, DM2, CAD (stents), asthma, s/p laminectomy 10 d ago, presenting with ankle pain since Sunday. Pt states felt "stiff" on Sat. Pt denies recollection of rolling ankle/tripping. Has been using walker since surgery.     denies fevers, chills, cp, sob, no constipation/urinary urgency or rentetion. No shooting leg pain. 60 yo F HLD, HTN, DM2, CAD (stents), asthma, s/p laminectomy 10 d ago, presenting with ankle pain since Sunday. Pt states felt "stiff" on Sat. Pt denies recollection of rolling ankle/tripping. Has been using walker since surgery but unable to ambulate today.     denies fevers, chills, cp, sob, no constipation/urinary urgency or rentetion. No shooting leg pain.    pcp chaim 60 yo F HLD, HTN, DM2, CAD (stents), asthma, s/p laminectomy 10 d ago, presenting with ankle pain since Sunday. Pt states felt "stiff" on Sat. Pt denies recollection of rolling ankle/tripping. Has been using walker since surgery but unable to ambulate today.     denies fevers, chills, cp, sob, no constipation/urinary urgency or retention. No shooting leg pain.    pcp chaim

## 2017-11-29 ENCOUNTER — APPOINTMENT (OUTPATIENT)
Dept: PEDIATRIC ALLERGY IMMUNOLOGY | Facility: CLINIC | Age: 62
End: 2017-11-29

## 2017-11-29 DIAGNOSIS — E11.9 TYPE 2 DIABETES MELLITUS WITHOUT COMPLICATIONS: ICD-10-CM

## 2017-11-29 DIAGNOSIS — I25.10 ATHEROSCLEROTIC HEART DISEASE OF NATIVE CORONARY ARTERY WITHOUT ANGINA PECTORIS: ICD-10-CM

## 2017-11-29 DIAGNOSIS — I10 ESSENTIAL (PRIMARY) HYPERTENSION: ICD-10-CM

## 2017-11-29 DIAGNOSIS — Z29.9 ENCOUNTER FOR PROPHYLACTIC MEASURES, UNSPECIFIED: ICD-10-CM

## 2017-11-29 DIAGNOSIS — M48.07 SPINAL STENOSIS, LUMBOSACRAL REGION: ICD-10-CM

## 2017-11-29 DIAGNOSIS — J45.20 MILD INTERMITTENT ASTHMA, UNCOMPLICATED: ICD-10-CM

## 2017-11-29 DIAGNOSIS — M25.571 PAIN IN RIGHT ANKLE AND JOINTS OF RIGHT FOOT: ICD-10-CM

## 2017-11-29 LAB
ALBUMIN SERPL ELPH-MCNC: 3.7 G/DL — SIGNIFICANT CHANGE UP (ref 3.3–5)
ALP SERPL-CCNC: 76 U/L — SIGNIFICANT CHANGE UP (ref 40–120)
ALT FLD-CCNC: 16 U/L RC — SIGNIFICANT CHANGE UP (ref 10–45)
ANION GAP SERPL CALC-SCNC: 14 MMOL/L — SIGNIFICANT CHANGE UP (ref 5–17)
AST SERPL-CCNC: 13 U/L — SIGNIFICANT CHANGE UP (ref 10–40)
BASOPHILS # BLD AUTO: 0 K/UL — SIGNIFICANT CHANGE UP (ref 0–0.2)
BASOPHILS NFR BLD AUTO: 0.6 % — SIGNIFICANT CHANGE UP (ref 0–2)
BILIRUB SERPL-MCNC: 0.9 MG/DL — SIGNIFICANT CHANGE UP (ref 0.2–1.2)
BUN SERPL-MCNC: 12 MG/DL — SIGNIFICANT CHANGE UP (ref 7–23)
CALCIUM SERPL-MCNC: 9.1 MG/DL — SIGNIFICANT CHANGE UP (ref 8.4–10.5)
CHLORIDE SERPL-SCNC: 105 MMOL/L — SIGNIFICANT CHANGE UP (ref 96–108)
CO2 SERPL-SCNC: 26 MMOL/L — SIGNIFICANT CHANGE UP (ref 22–31)
CREAT SERPL-MCNC: 0.68 MG/DL — SIGNIFICANT CHANGE UP (ref 0.5–1.3)
D DIMER BLD IA.RAPID-MCNC: 430 NG/ML DDU — HIGH
EOSINOPHIL # BLD AUTO: 0.2 K/UL — SIGNIFICANT CHANGE UP (ref 0–0.5)
EOSINOPHIL NFR BLD AUTO: 2.2 % — SIGNIFICANT CHANGE UP (ref 0–6)
GLUCOSE BLDC GLUCOMTR-MCNC: 133 MG/DL — HIGH (ref 70–99)
GLUCOSE BLDC GLUCOMTR-MCNC: 139 MG/DL — HIGH (ref 70–99)
GLUCOSE BLDC GLUCOMTR-MCNC: 172 MG/DL — HIGH (ref 70–99)
GLUCOSE BLDC GLUCOMTR-MCNC: 198 MG/DL — HIGH (ref 70–99)
GLUCOSE SERPL-MCNC: 102 MG/DL — HIGH (ref 70–99)
HCT VFR BLD CALC: 34.2 % — LOW (ref 34.5–45)
HGB BLD-MCNC: 11.5 G/DL — SIGNIFICANT CHANGE UP (ref 11.5–15.5)
LYMPHOCYTES # BLD AUTO: 2.2 K/UL — SIGNIFICANT CHANGE UP (ref 1–3.3)
LYMPHOCYTES # BLD AUTO: 29.5 % — SIGNIFICANT CHANGE UP (ref 13–44)
MAGNESIUM SERPL-MCNC: 1.6 MG/DL — SIGNIFICANT CHANGE UP (ref 1.6–2.6)
MCHC RBC-ENTMCNC: 31.3 PG — SIGNIFICANT CHANGE UP (ref 27–34)
MCHC RBC-ENTMCNC: 33.6 GM/DL — SIGNIFICANT CHANGE UP (ref 32–36)
MCV RBC AUTO: 93.1 FL — SIGNIFICANT CHANGE UP (ref 80–100)
MONOCYTES # BLD AUTO: 0.8 K/UL — SIGNIFICANT CHANGE UP (ref 0–0.9)
MONOCYTES NFR BLD AUTO: 11.2 % — SIGNIFICANT CHANGE UP (ref 2–14)
NEUTROPHILS # BLD AUTO: 4.2 K/UL — SIGNIFICANT CHANGE UP (ref 1.8–7.4)
NEUTROPHILS NFR BLD AUTO: 56.5 % — SIGNIFICANT CHANGE UP (ref 43–77)
PHOSPHATE SERPL-MCNC: 3.8 MG/DL — SIGNIFICANT CHANGE UP (ref 2.5–4.5)
PLATELET # BLD AUTO: 362 K/UL — SIGNIFICANT CHANGE UP (ref 150–400)
POTASSIUM SERPL-MCNC: 3.5 MMOL/L — SIGNIFICANT CHANGE UP (ref 3.5–5.3)
POTASSIUM SERPL-SCNC: 3.5 MMOL/L — SIGNIFICANT CHANGE UP (ref 3.5–5.3)
PROT SERPL-MCNC: 6.9 G/DL — SIGNIFICANT CHANGE UP (ref 6–8.3)
RBC # BLD: 3.67 M/UL — LOW (ref 3.8–5.2)
RBC # FLD: 11.7 % — SIGNIFICANT CHANGE UP (ref 10.3–14.5)
SODIUM SERPL-SCNC: 145 MMOL/L — SIGNIFICANT CHANGE UP (ref 135–145)
URATE SERPL-MCNC: 4.3 MG/DL — SIGNIFICANT CHANGE UP (ref 2.5–7)
WBC # BLD: 7.5 K/UL — SIGNIFICANT CHANGE UP (ref 3.8–10.5)
WBC # FLD AUTO: 7.5 K/UL — SIGNIFICANT CHANGE UP (ref 3.8–10.5)

## 2017-11-29 PROCEDURE — 93971 EXTREMITY STUDY: CPT | Mod: 26

## 2017-11-29 PROCEDURE — 12345: CPT | Mod: NC

## 2017-11-29 PROCEDURE — 99223 1ST HOSP IP/OBS HIGH 75: CPT

## 2017-11-29 PROCEDURE — 99223 1ST HOSP IP/OBS HIGH 75: CPT | Mod: GC

## 2017-11-29 RX ORDER — ATORVASTATIN CALCIUM 80 MG/1
20 TABLET, FILM COATED ORAL AT BEDTIME
Qty: 0 | Refills: 0 | Status: DISCONTINUED | OUTPATIENT
Start: 2017-11-29 | End: 2017-12-01

## 2017-11-29 RX ORDER — TIOTROPIUM BROMIDE 18 UG/1
1 CAPSULE ORAL; RESPIRATORY (INHALATION) DAILY
Qty: 0 | Refills: 0 | Status: DISCONTINUED | OUTPATIENT
Start: 2017-11-29 | End: 2017-12-01

## 2017-11-29 RX ORDER — DEXTROSE 50 % IN WATER 50 %
25 SYRINGE (ML) INTRAVENOUS ONCE
Qty: 0 | Refills: 0 | Status: DISCONTINUED | OUTPATIENT
Start: 2017-11-29 | End: 2017-12-01

## 2017-11-29 RX ORDER — AMLODIPINE BESYLATE 2.5 MG/1
10 TABLET ORAL DAILY
Qty: 0 | Refills: 0 | Status: DISCONTINUED | OUTPATIENT
Start: 2017-11-29 | End: 2017-12-01

## 2017-11-29 RX ORDER — SODIUM CHLORIDE 9 MG/ML
1000 INJECTION, SOLUTION INTRAVENOUS
Qty: 0 | Refills: 0 | Status: DISCONTINUED | OUTPATIENT
Start: 2017-11-29 | End: 2017-12-01

## 2017-11-29 RX ORDER — ASPIRIN/CALCIUM CARB/MAGNESIUM 324 MG
81 TABLET ORAL DAILY
Qty: 0 | Refills: 0 | Status: DISCONTINUED | OUTPATIENT
Start: 2017-11-29 | End: 2017-12-01

## 2017-11-29 RX ORDER — DEXTROSE 50 % IN WATER 50 %
1 SYRINGE (ML) INTRAVENOUS ONCE
Qty: 0 | Refills: 0 | Status: DISCONTINUED | OUTPATIENT
Start: 2017-11-29 | End: 2017-12-01

## 2017-11-29 RX ORDER — ZALEPLON 10 MG
5 CAPSULE ORAL ONCE
Qty: 0 | Refills: 0 | Status: DISCONTINUED | OUTPATIENT
Start: 2017-11-29 | End: 2017-11-29

## 2017-11-29 RX ORDER — INSULIN LISPRO 100/ML
VIAL (ML) SUBCUTANEOUS
Qty: 0 | Refills: 0 | Status: DISCONTINUED | OUTPATIENT
Start: 2017-11-29 | End: 2017-12-01

## 2017-11-29 RX ORDER — DEXTROSE 50 % IN WATER 50 %
12.5 SYRINGE (ML) INTRAVENOUS ONCE
Qty: 0 | Refills: 0 | Status: DISCONTINUED | OUTPATIENT
Start: 2017-11-29 | End: 2017-12-01

## 2017-11-29 RX ORDER — INSULIN LISPRO 100/ML
VIAL (ML) SUBCUTANEOUS AT BEDTIME
Qty: 0 | Refills: 0 | Status: DISCONTINUED | OUTPATIENT
Start: 2017-11-29 | End: 2017-12-01

## 2017-11-29 RX ORDER — GLUCAGON INJECTION, SOLUTION 0.5 MG/.1ML
1 INJECTION, SOLUTION SUBCUTANEOUS ONCE
Qty: 0 | Refills: 0 | Status: DISCONTINUED | OUTPATIENT
Start: 2017-11-29 | End: 2017-12-01

## 2017-11-29 RX ORDER — MAGNESIUM SULFATE 500 MG/ML
2 VIAL (ML) INJECTION ONCE
Qty: 0 | Refills: 0 | Status: COMPLETED | OUTPATIENT
Start: 2017-11-29 | End: 2017-11-29

## 2017-11-29 RX ORDER — METOPROLOL TARTRATE 50 MG
50 TABLET ORAL
Qty: 0 | Refills: 0 | Status: DISCONTINUED | OUTPATIENT
Start: 2017-11-29 | End: 2017-12-01

## 2017-11-29 RX ORDER — LISINOPRIL 2.5 MG/1
2.5 TABLET ORAL DAILY
Qty: 0 | Refills: 0 | Status: DISCONTINUED | OUTPATIENT
Start: 2017-11-29 | End: 2017-12-01

## 2017-11-29 RX ORDER — HEPARIN SODIUM 5000 [USP'U]/ML
5000 INJECTION INTRAVENOUS; SUBCUTANEOUS EVERY 8 HOURS
Qty: 0 | Refills: 0 | Status: DISCONTINUED | OUTPATIENT
Start: 2017-11-29 | End: 2017-12-01

## 2017-11-29 RX ORDER — ACETAMINOPHEN 500 MG
650 TABLET ORAL EVERY 6 HOURS
Qty: 0 | Refills: 0 | Status: DISCONTINUED | OUTPATIENT
Start: 2017-11-29 | End: 2017-12-01

## 2017-11-29 RX ORDER — MONTELUKAST 4 MG/1
10 TABLET, CHEWABLE ORAL DAILY
Qty: 0 | Refills: 0 | Status: DISCONTINUED | OUTPATIENT
Start: 2017-11-29 | End: 2017-12-01

## 2017-11-29 RX ADMIN — Medication 81 MILLIGRAM(S): at 13:57

## 2017-11-29 RX ADMIN — Medication 500 MILLIGRAM(S): at 21:44

## 2017-11-29 RX ADMIN — AMLODIPINE BESYLATE 10 MILLIGRAM(S): 2.5 TABLET ORAL at 05:18

## 2017-11-29 RX ADMIN — LISINOPRIL 2.5 MILLIGRAM(S): 2.5 TABLET ORAL at 05:18

## 2017-11-29 RX ADMIN — Medication 500 MILLIGRAM(S): at 22:14

## 2017-11-29 RX ADMIN — Medication 1: at 13:56

## 2017-11-29 RX ADMIN — Medication 50 GRAM(S): at 18:26

## 2017-11-29 RX ADMIN — TIOTROPIUM BROMIDE 1 CAPSULE(S): 18 CAPSULE ORAL; RESPIRATORY (INHALATION) at 18:27

## 2017-11-29 RX ADMIN — MONTELUKAST 10 MILLIGRAM(S): 4 TABLET, CHEWABLE ORAL at 18:27

## 2017-11-29 RX ADMIN — ATORVASTATIN CALCIUM 20 MILLIGRAM(S): 80 TABLET, FILM COATED ORAL at 21:44

## 2017-11-29 RX ADMIN — Medication 50 MILLIGRAM(S): at 18:27

## 2017-11-29 RX ADMIN — HEPARIN SODIUM 5000 UNIT(S): 5000 INJECTION INTRAVENOUS; SUBCUTANEOUS at 21:45

## 2017-11-29 RX ADMIN — Medication 5 MILLIGRAM(S): at 22:14

## 2017-11-29 RX ADMIN — HEPARIN SODIUM 5000 UNIT(S): 5000 INJECTION INTRAVENOUS; SUBCUTANEOUS at 13:57

## 2017-11-29 RX ADMIN — Medication 650 MILLIGRAM(S): at 05:20

## 2017-11-29 RX ADMIN — Medication 50 MILLIGRAM(S): at 05:18

## 2017-11-29 NOTE — H&P ADULT - NSHPSOCIALHISTORY_GEN_ALL_CORE
Social History:    Marital Status:  ( x  )    (   ) Single    (   )    (  )   Occupation:   Lives with: (  ) alone  (  ) children   ( x ) spouse   (  ) parents  (  ) other    Substance Use (street drugs): (  x) never used  (  ) other:  Tobacco Usage:  (  x ) never smoked   (   ) former smoker   (   ) current smoker  (     ) pack year  (        ) last cigarette date  Alcohol Usage: denies

## 2017-11-29 NOTE — H&P ADULT - NSHPLABSRESULTS_GEN_ALL_CORE
NO LABS DRAWN IN ER SO NONE AVAILABLE FOR PERSONAL REVIEW    NO EKG AVAILABLE FOR PERSONAL REVIEW    Imaging personally reviewed XR R ankle 3 view showed no acute fx/dislocation, joint space preserved, +plantar/achilles enthesophytes, soft tissue unremarkable.

## 2017-11-29 NOTE — H&P ADULT - ASSESSMENT
61 F PMH spinal stenosis and herniated disc s/p lumbo-sacral laminectomy 11/17/17 c/b allergic rxn to percocet, CAD s/p stent, T2DM, HTN, HLD, mild asthma, p/w 3 days of progressive R ankle pain.

## 2017-11-29 NOTE — PROGRESS NOTE ADULT - SUBJECTIVE AND OBJECTIVE BOX
CC:  Right ankle pain    SUBJECTIVE:  Resting in ED stretcher.  Still with right ankle pain making it difficult to bear weight.  Again denies any recent trauma or injury.  No SOB.  No N/V.  NAD.    MEDICATIONS  (STANDING):  amLODIPine   Tablet 10 milliGRAM(s) Oral daily  aspirin  chewable 81 milliGRAM(s) Oral daily  atorvastatin 20 milliGRAM(s) Oral at bedtime  dextrose 5%. 1000 milliLiter(s) (50 mL/Hr) IV Continuous <Continuous>  dextrose 50% Injectable 12.5 Gram(s) IV Push once  dextrose 50% Injectable 25 Gram(s) IV Push once  dextrose 50% Injectable 25 Gram(s) IV Push once  heparin  Injectable 5000 Unit(s) SubCutaneous every 8 hours  insulin lispro (HumaLOG) corrective regimen sliding scale   SubCutaneous three times a day before meals  insulin lispro (HumaLOG) corrective regimen sliding scale   SubCutaneous at bedtime  lisinopril 2.5 milliGRAM(s) Oral daily  metoprolol     tartrate 50 milliGRAM(s) Oral two times a day  montelukast 10 milliGRAM(s) Oral daily  tiotropium 18 MICROgram(s) Capsule 1 Capsule(s) Inhalation daily    MEDICATIONS  (PRN):  acetaminophen   Tablet. 650 milliGRAM(s) Oral every 6 hours PRN mild moderate or severe pain  dextrose Gel 1 Dose(s) Oral once PRN Blood Glucose LESS THAN 70 milliGRAM(s)/deciliter  glucagon  Injectable 1 milliGRAM(s) IntraMuscular once PRN Glucose LESS THAN 70 milligrams/deciliter      Vital Signs Last 24 Hrs  T(C): 36.7 (29 Nov 2017 08:04), Max: 37.3 (28 Nov 2017 11:58)  T(F): 98 (29 Nov 2017 08:04), Max: 99.1 (28 Nov 2017 11:58)  HR: 66 (29 Nov 2017 08:04) (58 - 73)  BP: 131/72 (29 Nov 2017 08:04) (115/74 - 137/69)  BP(mean): --  RR: 18 (29 Nov 2017 08:04) (16 - 18)  SpO2: 96% (29 Nov 2017 08:04) (95% - 99%)    CAPILLARY BLOOD GLUCOSE      POCT Blood Glucose.: 133 mg/dL (29 Nov 2017 09:28)  POCT Blood Glucose.: 70 mg/dL (28 Nov 2017 19:32)    I&O's Summary      PHYSICAL EXAM:  GENERAL: Looks stated age, NAD.  CARDIOVASCULAR: Normal S1, S2.  PULMONARY: Lungs clear to auscultation bilaterally. No wheezes/rales/rhonchi  GI: Abdomen soft, Nontender, Nondistended; Bowel sounds present  MSK/Ext:  Trace distal leg edema bilaterally.  Right ankle with focal TTP beneath the medial malleolus.  No warmth or erythema noted.  No calf tenderness bilaterally.  PSYCH: Normal Affect. AAOx3      LABS:                        11.5   7.5   )-----------( 362      ( 29 Nov 2017 02:26 )             34.2     11-29    145  |  105  |  12  ----------------------------<  102<H>  3.5   |  26  |  0.68    Ca    9.1      29 Nov 2017 02:26  Phos  3.8     11-29  Mg     1.6     11-29    TPro  6.9  /  Alb  3.7  /  TBili  0.9  /  DBili  x   /  AST  13  /  ALT  16  /  AlkPhos  76  11-29    d-dimer - 430  Uric acid - 4.3        RADIOLOGY & ADDITIONAL TESTS:

## 2017-11-29 NOTE — CONSULT NOTE ADULT - SUBJECTIVE AND OBJECTIVE BOX
[facility]  [id number]    HISTORY OF PRESENT ILLNESS:  61yoF hx of CAD, HTN, T2DM, spinal stenosis s/p laminectomy on 11/17  presenting with R ankle pain x 5 days.   Rheum consulted for further evaluation.     Ankle pain started on Saturday 11/25 as tightness on top of her foot feeling better with massage. Following day, pain worsened. The next morning, the pain was so severe it woke her from sleep. She had difficulty walking 2/2 pain when bearing weight.   She has never had pain like this before. Better with elevation and rest, worsened with weight bearing and movement of the joint.   Tried taking Tylenol at home which did not help.    Pt denies recently twisting her feet or missing a step, denies wearing new uncomfortable shoes.   She has had no hx of gout before, denies any joint pains, fevers, numbness/tingling in her foot. No overlying rashes.     In the summer, she had twisted her R ankle- saw podiatrist and eventually pain resolved.   Pt has had dx of spinal stenosis for 3 years and has had difficulty ambulation. She therefore had the laminectomy done on 11/17, c/b anaphylactic reaction requiring admission.     PAST MEDICAL & SURGICAL HISTORY:  Scoliosis  CAD (coronary artery disease): h/o PCI with stenting x1  Spinal stenosis of lumbar region  Dyslipidemia  H/O: iron deficiency anemia: h/o blood transfusion in 2002  Mild persistent asthma in adult without complication: last rescue inhaler used over 6 months ago  Snores  T2DM (type 2 diabetes mellitus): no daily fingerstick   last A1C  (10+) as per patient  H/O: HTN (hypertension): on medication  History of myocardial infarction: h/o upper back and left arm pain prompted an angioplasty with stenting x 1 artery in 2000  last echo/ ekg April 2017  H/O laminectomy  H/O colonoscopy  History of cholecystectomy      Review of Systems:  Gen:  No fevers/chills  HEENT: No blurry vision  CVS: No chest pain/palpitations  Resp: No SOB/wheezing  GI: No N/V/C/D/abdominal pain  MSK: +R ankle pain, no other joint pains.   Skin: No new rashes  Neuro: No headaches    MEDICATIONS  (STANDING):  amLODIPine   Tablet 10 milliGRAM(s) Oral daily  aspirin  chewable 81 milliGRAM(s) Oral daily  atorvastatin 20 milliGRAM(s) Oral at bedtime  dextrose 5%. 1000 milliLiter(s) (50 mL/Hr) IV Continuous <Continuous>  dextrose 50% Injectable 12.5 Gram(s) IV Push once  dextrose 50% Injectable 25 Gram(s) IV Push once  dextrose 50% Injectable 25 Gram(s) IV Push once  heparin  Injectable 5000 Unit(s) SubCutaneous every 8 hours  insulin lispro (HumaLOG) corrective regimen sliding scale   SubCutaneous three times a day before meals  insulin lispro (HumaLOG) corrective regimen sliding scale   SubCutaneous at bedtime  lisinopril 2.5 milliGRAM(s) Oral daily  magnesium sulfate  IVPB 2 Gram(s) IV Intermittent once  metoprolol     tartrate 50 milliGRAM(s) Oral two times a day  montelukast 10 milliGRAM(s) Oral daily  tiotropium 18 MICROgram(s) Capsule 1 Capsule(s) Inhalation daily    MEDICATIONS  (PRN):  acetaminophen   Tablet. 650 milliGRAM(s) Oral every 6 hours PRN mild moderate or severe pain  dextrose Gel 1 Dose(s) Oral once PRN Blood Glucose LESS THAN 70 milliGRAM(s)/deciliter  glucagon  Injectable 1 milliGRAM(s) IntraMuscular once PRN Glucose LESS THAN 70 milligrams/deciliter      Allergies    penicillin (Pruritus; Rash)  Percocet 10/325 (Anaphylaxis)  sulfa drugs (Pruritus; Rash)    Intolerances    PERTINENT MEDICATION HISTORY:    SOCIAL HISTORY: No toxic habits, lives at home with .   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 No crackles/wheezing  GI: Soft, NT/ND +BS  MSK:  Shoulders: wnl  Elbows: wnl  Wrists: wnl  MCPs: wnl  PIPs: wnl  DIPs: wnl   Hips: wnl  Knees: wnl   Ankle: R ankle: pt has tenderness on palpation anterior to medial malleolus. There is no evident effusion/erythema or warmth.   Neuro: AAOx3  Skin: no visible rashes    LABS:                        11.5   7.5   )-----------( 362      ( 29 Nov 2017 02:26 )             34.2     11-29    145  |  105  |  12  ----------------------------<  102<H>  3.5   |  26  |  0.68    Ca    9.1      29 Nov 2017 02:26  Phos  3.8     11-29  Mg     1.6     11-29    TPro  6.9  /  Alb  3.7  /  TBili  0.9  /  DBili  x   /  AST  13  /  ALT  16  /  AlkPhos  76  11-29      Uric Acid, Serum (11.29.17 @ 02:26)    Uric Acid, Serum: 4.3 mg/dL        RADIOLOGY & ADDITIONAL STUDIES:  < from: Xray Ankle Complete 3 Views, Right (11.28.17 @ 16:46) >  Impression:    No acute fractures or dislocations. The joint spaces are preserved.   Plantar and Achilles calcaneal enthesophytes. Soft tissues are   unremarkable.    < end of copied text > [facility]  [id number]    HISTORY OF PRESENT ILLNESS:  61yoF hx of CAD, HTN, T2DM, spinal stenosis s/p laminectomy on 11/17  presenting with R ankle pain x 5 days.   Rheum consulted for further evaluation.     Ankle pain started on Saturday 11/25 as tightness on top of her foot feeling better with massage. Following day, pain worsened. The next morning, the pain was so severe it woke her from sleep. She had difficulty walking 2/2 pain when bearing weight.   She has never had pain like this before. Better with elevation and rest, worsened with weight bearing and movement of the joint.   Tried taking Tylenol at home which did not help.    Pt denies recently twisting her feet or missing a step, denies wearing new uncomfortable shoes.   She has had no hx of gout before, denies any joint pains, fevers, numbness/tingling in her foot. No overlying rashes.     In the summer, she had twisted her R ankle- saw podiatrist and eventually pain resolved.   Pt has had dx of spinal stenosis for 3 years and has had difficulty ambulation. She therefore had the laminectomy done on 11/17, c/b anaphylactic reaction requiring admission.     PAST MEDICAL & SURGICAL HISTORY:  Scoliosis  CAD (coronary artery disease): h/o PCI with stenting x1  Spinal stenosis of lumbar region  Dyslipidemia  H/O: iron deficiency anemia: h/o blood transfusion in 2002  Mild persistent asthma in adult without complication: last rescue inhaler used over 6 months ago  Snores  T2DM (type 2 diabetes mellitus): no daily fingerstick   last A1C  (10+) as per patient  H/O: HTN (hypertension): on medication  History of myocardial infarction: h/o upper back and left arm pain prompted an angioplasty with stenting x 1 artery in 2000  last echo/ ekg April 2017  H/O laminectomy  H/O colonoscopy  History of cholecystectomy      Review of Systems:  Gen:  No fevers/chills  HEENT: No blurry vision  CVS: No chest pain/palpitations  Resp: No SOB/wheezing  GI: No N/V/C/D/abdominal pain  MSK: +R ankle pain, no other joint pains.   Skin: No new rashes  Neuro: No headaches    MEDICATIONS  (STANDING):  amLODIPine   Tablet 10 milliGRAM(s) Oral daily  aspirin  chewable 81 milliGRAM(s) Oral daily  atorvastatin 20 milliGRAM(s) Oral at bedtime  dextrose 5%. 1000 milliLiter(s) (50 mL/Hr) IV Continuous <Continuous>  dextrose 50% Injectable 12.5 Gram(s) IV Push once  dextrose 50% Injectable 25 Gram(s) IV Push once  dextrose 50% Injectable 25 Gram(s) IV Push once  heparin  Injectable 5000 Unit(s) SubCutaneous every 8 hours  insulin lispro (HumaLOG) corrective regimen sliding scale   SubCutaneous three times a day before meals  insulin lispro (HumaLOG) corrective regimen sliding scale   SubCutaneous at bedtime  lisinopril 2.5 milliGRAM(s) Oral daily  magnesium sulfate  IVPB 2 Gram(s) IV Intermittent once  metoprolol     tartrate 50 milliGRAM(s) Oral two times a day  montelukast 10 milliGRAM(s) Oral daily  tiotropium 18 MICROgram(s) Capsule 1 Capsule(s) Inhalation daily    MEDICATIONS  (PRN):  acetaminophen   Tablet. 650 milliGRAM(s) Oral every 6 hours PRN mild moderate or severe pain  dextrose Gel 1 Dose(s) Oral once PRN Blood Glucose LESS THAN 70 milliGRAM(s)/deciliter  glucagon  Injectable 1 milliGRAM(s) IntraMuscular once PRN Glucose LESS THAN 70 milligrams/deciliter      Allergies    penicillin (Pruritus; Rash)  Percocet 10/325 (Anaphylaxis)  sulfa drugs (Pruritus; Rash)    Intolerances    PERTINENT MEDICATION HISTORY:    SOCIAL HISTORY: No toxic habits, lives at home with .   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 No crackles/wheezing  GI: Soft, NT/ND +BS  MSK:  Shoulders: wnl  Elbows: wnl  Wrists: wnl  MCPs: wnl  PIPs: wnl  DIPs: wnl   Hips: wnl  Knees: wnl   Ankle: R ankle: pt has tenderness on palpation inferior to medial malleolus. There is no evident effusion/erythema or warmth. cannot stand on R foot when elevating L foot  Neuro: AAOx3  Skin: no visible rashes    LABS:                        11.5   7.5   )-----------( 362      ( 29 Nov 2017 02:26 )             34.2     11-29    145  |  105  |  12  ----------------------------<  102<H>  3.5   |  26  |  0.68    Ca    9.1      29 Nov 2017 02:26  Phos  3.8     11-29  Mg     1.6     11-29    TPro  6.9  /  Alb  3.7  /  TBili  0.9  /  DBili  x   /  AST  13  /  ALT  16  /  AlkPhos  76  11-29      Uric Acid, Serum (11.29.17 @ 02:26)    Uric Acid, Serum: 4.3 mg/dL        RADIOLOGY & ADDITIONAL STUDIES:  < from: Xray Ankle Complete 3 Views, Right (11.28.17 @ 16:46) >  Impression:    No acute fractures or dislocations. The joint spaces are preserved.   Plantar and Achilles calcaneal enthesophytes. Soft tissues are   unremarkable.    < end of copied text >

## 2017-11-29 NOTE — H&P ADULT - NSHPREVIEWOFSYSTEMS_GEN_ALL_CORE
REVIEW OF SYSTEMS:  CONSTITUTIONAL: No weakness. No fevers. No chills. No weight loss. Good appetite.  EYES: No visual changes. No eye pain.  ENT: No hearing difficulty. No vertigo. No dysphagia. No Sinusitis/rhinorrhea.  NECK: No pain. No stiffness/rigidity.  CARDIAC: No chest pain. No palpitations.  RESPIRATORY: No cough. No SOB. No hemoptysis.  GASTROINTESTINAL: No abdominal pain. No nausea. No vomiting. No hematemesis. No diarrhea. No constipation. No melena. No hematochezia.  GENITOURINARY: No dysuria. No frequency. No hesitancy. No hematuria.  NEUROLOGICAL: No numbness. No focal weakness. No incontinence. No headache.  BACK: No back pain.  EXTREMITIES: No lower extremity edema. Dec RLE ROM.  SKIN: No rashes. No itching. No other lesions.  PSYCHIATRIC: No depression. No anxiety. No SI/HI.  ALLERGIC: No lip swelling. No hives.  All other review of systems is negative unless indicated above.

## 2017-11-29 NOTE — H&P ADULT - NSHPPHYSICALEXAM_GEN_ALL_CORE
PHYSICAL EXAM:  GENERAL: NAD, well-groomed, well-developed  HEAD:  Atraumatic, Normocephalic  EYES: EOMI, PERRLA, conjunctiva and sclera clear  ENMT: No tonsillar erythema, exudates, or enlargement; Moist mucous membranes, Good dentition, No lesions  NECK: Supple, No JVD, Normal thyroid  CHEST/LUNG: Clear to percussion bilaterally; No rales, rhonchi, wheezing, or rubs  HEART: Regular rate and rhythm; No murmurs, rubs, or gallops, no LE edema.   ABDOMEN: Soft, Nontender, Nondistended; Bowel sounds present  EXTREMITIES:  2+ Peripheral Pulses, No clubbing, cyanosis. No joint swelling or overlying erythema/warmth, no purulent drainiage, no fluctuance of either extremities.  Limited ROM of RLE at ankle; no pain with passive/active flexion/extension of R ankle but +pain on active EVERSION none on inversion.   LYMPH: No lymphadenopathy noted  SKIN: No rashes or lesions  NERVOUS SYSTEM:  Alert & Oriented X3, Good concentration; Motor Strength 5/5 B/L upper and lower extremities

## 2017-11-29 NOTE — H&P ADULT - PROBLEM SELECTOR PLAN 1
-etiology unclear at this point, neurosx recs appreciated does not appear to be directly related to prior well tolerated surgery.   -ddx includes but not limited to: ankle sprain vs. dvt vs. gout vs. OA vs. soft tissue/ligament injury  -obtain stat set of basic admission labs cbc/cmp/mag/phos  -check uric acid  -wells score for DVT = 1 for recent bedridden surgery. check D-dimer to r/o dvt, if + can get RLE doppler  -Given the fact that pain has been progressive, is reproducible on exam, had patient cannot bear weight or walk, and XR was nondiagnostic, will proceed to MRI R ankle. Consider ortho c/s in am if sx are persistent or any findings on MRI  -pain ctl with tylenol is working

## 2017-11-29 NOTE — PROGRESS NOTE ADULT - ASSESSMENT
The patient is a 61-year-old woman with PMH of spinal stenosis and herniated disc s/p lumbo-sacral laminectomy 11/17/17 c/b allergic rxn to percocet, CAD s/p stent, Type 2 DM, HTN, HLD, mild asthma, p/w 3 days of progressive R ankle pain.

## 2017-11-29 NOTE — CONSULT NOTE ADULT - ASSESSMENT
61yoF hx of CAD, HTN, T2DM, spinal stenosis s/p laminectomy on 11/17  presenting with R ankle pain x 5 days.   Rheum consulted for further evaluation.  Physical exam is suggestive of tendonitis    1) Tendonitis- pt has localized pain of R ankle, starting acutely after hospitalization s/p laminectomy.  There is no evident joint effusion, warmth, or erythema which would be suggestive of underlying crystalline arthropathy or infection. Pt has no hx of gout and also has normal uric acid level.   She denies any allergies to NSAIDs.   - Would rec trial of NSAIDs- Ibuprofen 400-600mg q8 hours for pain relief  - Will f/u MRI Ankle    Will reevaluate tomorrow 61yoF hx of CAD, HTN, T2DM, spinal stenosis s/p laminectomy on 11/17  presenting with MEDIAL R ankle pain x 5 days.   Rheum consulted for further evaluation.  Physical exam is suggestive of psoterior tibial tendonitis    1) Tendonitis- pt has localized pain of R ankle, starting acutely after hospitalization s/p laminectomy.  She also reported an event where both ankles inverted when she slipped. this would localize the pain/sx to the post. tibial tendon appropriately.   There is no evident joint effusion, warmth, or erythema which would be suggestive of underlying crystalline arthropathy or infection. Pt has no hx of gout and also has normal uric acid level.   She denies any allergies to NSAIDs.   - Would rec trial of NSAIDs- Ibuprofen 400-600mg q8 hours for pain relief  - Will f/u MRI Ankle    Will reevaluate tomorrow

## 2017-11-29 NOTE — CONSULT NOTE ADULT - ATTENDING COMMENTS
Patient seen and examined by me personally with fellow - hx and exam c/w posterior tibial tendonitis  Low suspicion for gout or fracture.    Agree with assessment and plan as above   Outpatient plan in place upon discharge..  Patient/family aware of our assessment and plans.

## 2017-11-29 NOTE — H&P ADULT - HISTORY OF PRESENT ILLNESS
61 F PMH spinal stenosis and herniated disc s/p lumbo-sacral laminectomy 11/17/17 c/b allergic rxn to percocet, CAD s/p stent, T2DM, HTN, HLD, mild asthma, p/w 3 days of progressive R ankle pain.  Pt reporedly did well after recent surgery; was ambulating afterwards without issues.  She says about 3 days ago first noticed R ankle/LE tightness. Still able to ambulate but with some difficulty  Sx progressed to overt pain, eventually pt could not bear weight on ankle or ambulate.  Denies CP/SOB/f/c, denies trauma to leg/ankle, denies personal or familiy hx of gout or DVT.  Was taking dvt ppx on prior hospitalization but not afterwards.  Denies LE swelling/warmth/erythema, denies LE purulent discharge. Saw Dr. Moran from neurosx in last few days, reportedly was given clean bill of health from spinal standpoint; there was concern over R ankle pain, and though not attributed to spinal surgery, was sent to ER for more workup, possible MRI per patient.     VS: 97.8, 58, 115/74, 18, 97% RA.  Labs: no labs were drawn in ER.  XR R ankle 3 view showed no acute fx/dislocation, joint space preserved, +plantar/achilles enthesophytes, soft tissue unremarkable.  Pt was initially to be d/c from ER but then had difficulty ambulating necessitating inpatient admission. Pain improved with tylenol.  Neurosx c/s in ER done. 61 F PMH spinal stenosis and herniated disc s/p lumbo-sacral laminectomy 11/17/17 c/b allergic rxn to percocet, CAD s/p stent, T2DM, HTN, HLD, mild asthma, p/w 3 days of progressive R ankle pain.  Pt reporedly did well after recent surgery; was ambulating afterwards without issues.  She says about 3 days ago first noticed R ankle/LE tightness. Still able to ambulate but with some difficulty  Sx progressed to overt pain, eventually pt could not bear weight on ankle or ambulate.  Denies CP/SOB/f/c, denies trauma to leg/ankle, denies personal or familiy hx of gout or DVT.  Was taking dvt ppx on prior hospitalization but not afterwards.  Denies LE swelling/warmth/erythema, denies LE purulent discharge. Saw Dr. Moran from neurosx in last few days, reportedly was given clean bill of health from spinal standpoint; there was concern over R ankle pain, and though not attributed to spinal surgery, was sent to ER for more workup, possible MRI per patient.    VS: 97.8, 58, 115/74, 18, 97% RA.  Labs: no labs were drawn in ER.  XR R ankle 3 view showed no acute fx/dislocation, joint space preserved, +plantar/achilles enthesophytes, soft tissue unremarkable.  Pt was initially to be d/c from ER but then had difficulty ambulating necessitating inpatient admission. Pain improved with tylenol.  Neurosx c/s in ER done.

## 2017-11-29 NOTE — H&P ADULT - ATTENDING COMMENTS
Case endorsed to NP    Care to be assumed by day hospitalist in am    This patient was assigned to me by the hospitalist in charge; my involvement in this case has consisted of the initial history, physical, chart review, and management plan.  This patient was previously unknown to me. Case endorsed to PAUL Cruz at 1:30 am, she will f/u labs and imaging.     Care to be assumed by day hospitalist in am    This patient was assigned to me by the hospitalist in charge; my involvement in this case has consisted of the initial history, physical, chart review, and management plan.  This patient was previously unknown to me.

## 2017-11-30 ENCOUNTER — TRANSCRIPTION ENCOUNTER (OUTPATIENT)
Age: 62
End: 2017-11-30

## 2017-11-30 DIAGNOSIS — K59.00 CONSTIPATION, UNSPECIFIED: ICD-10-CM

## 2017-11-30 DIAGNOSIS — D64.9 ANEMIA, UNSPECIFIED: ICD-10-CM

## 2017-11-30 LAB
ANION GAP SERPL CALC-SCNC: 11 MMOL/L — SIGNIFICANT CHANGE UP (ref 5–17)
BUN SERPL-MCNC: 11 MG/DL — SIGNIFICANT CHANGE UP (ref 7–23)
CALCIUM SERPL-MCNC: 9.1 MG/DL — SIGNIFICANT CHANGE UP (ref 8.4–10.5)
CHLORIDE SERPL-SCNC: 106 MMOL/L — SIGNIFICANT CHANGE UP (ref 96–108)
CO2 SERPL-SCNC: 26 MMOL/L — SIGNIFICANT CHANGE UP (ref 22–31)
CREAT SERPL-MCNC: 0.46 MG/DL — LOW (ref 0.5–1.3)
GLUCOSE BLDC GLUCOMTR-MCNC: 128 MG/DL — HIGH (ref 70–99)
GLUCOSE BLDC GLUCOMTR-MCNC: 147 MG/DL — HIGH (ref 70–99)
GLUCOSE BLDC GLUCOMTR-MCNC: 171 MG/DL — HIGH (ref 70–99)
GLUCOSE BLDC GLUCOMTR-MCNC: 181 MG/DL — HIGH (ref 70–99)
GLUCOSE SERPL-MCNC: 146 MG/DL — HIGH (ref 70–99)
HCT VFR BLD CALC: 32.1 % — LOW (ref 34.5–45)
HGB BLD-MCNC: 10.5 G/DL — LOW (ref 11.5–15.5)
MCHC RBC-ENTMCNC: 29.3 PG — SIGNIFICANT CHANGE UP (ref 27–34)
MCHC RBC-ENTMCNC: 32.7 GM/DL — SIGNIFICANT CHANGE UP (ref 32–36)
MCV RBC AUTO: 89.7 FL — SIGNIFICANT CHANGE UP (ref 80–100)
PLATELET # BLD AUTO: 350 K/UL — SIGNIFICANT CHANGE UP (ref 150–400)
POTASSIUM SERPL-MCNC: 3.8 MMOL/L — SIGNIFICANT CHANGE UP (ref 3.5–5.3)
POTASSIUM SERPL-SCNC: 3.8 MMOL/L — SIGNIFICANT CHANGE UP (ref 3.5–5.3)
RBC # BLD: 3.58 M/UL — LOW (ref 3.8–5.2)
RBC # FLD: 13.1 % — SIGNIFICANT CHANGE UP (ref 10.3–14.5)
SODIUM SERPL-SCNC: 143 MMOL/L — SIGNIFICANT CHANGE UP (ref 135–145)
WBC # BLD: 6.77 K/UL — SIGNIFICANT CHANGE UP (ref 3.8–10.5)
WBC # FLD AUTO: 6.77 K/UL — SIGNIFICANT CHANGE UP (ref 3.8–10.5)

## 2017-11-30 PROCEDURE — 99233 SBSQ HOSP IP/OBS HIGH 50: CPT

## 2017-11-30 PROCEDURE — 99231 SBSQ HOSP IP/OBS SF/LOW 25: CPT | Mod: GC

## 2017-11-30 PROCEDURE — 73721 MRI JNT OF LWR EXTRE W/O DYE: CPT | Mod: 26,RT

## 2017-11-30 RX ORDER — MULTIVIT WITH MIN/MFOLATE/K2 340-15/3 G
300 POWDER (GRAM) ORAL ONCE
Qty: 0 | Refills: 0 | Status: COMPLETED | OUTPATIENT
Start: 2017-11-30 | End: 2017-11-30

## 2017-11-30 RX ORDER — POLYETHYLENE GLYCOL 3350 17 G/17G
17 POWDER, FOR SOLUTION ORAL ONCE
Qty: 0 | Refills: 0 | Status: COMPLETED | OUTPATIENT
Start: 2017-11-30 | End: 2017-11-30

## 2017-11-30 RX ADMIN — HEPARIN SODIUM 5000 UNIT(S): 5000 INJECTION INTRAVENOUS; SUBCUTANEOUS at 21:22

## 2017-11-30 RX ADMIN — LISINOPRIL 2.5 MILLIGRAM(S): 2.5 TABLET ORAL at 08:26

## 2017-11-30 RX ADMIN — Medication 81 MILLIGRAM(S): at 12:54

## 2017-11-30 RX ADMIN — Medication 1: at 12:54

## 2017-11-30 RX ADMIN — AMLODIPINE BESYLATE 10 MILLIGRAM(S): 2.5 TABLET ORAL at 08:26

## 2017-11-30 RX ADMIN — MONTELUKAST 10 MILLIGRAM(S): 4 TABLET, CHEWABLE ORAL at 12:54

## 2017-11-30 RX ADMIN — HEPARIN SODIUM 5000 UNIT(S): 5000 INJECTION INTRAVENOUS; SUBCUTANEOUS at 06:37

## 2017-11-30 RX ADMIN — POLYETHYLENE GLYCOL 3350 17 GRAM(S): 17 POWDER, FOR SOLUTION ORAL at 21:22

## 2017-11-30 RX ADMIN — TIOTROPIUM BROMIDE 1 CAPSULE(S): 18 CAPSULE ORAL; RESPIRATORY (INHALATION) at 15:20

## 2017-11-30 RX ADMIN — ATORVASTATIN CALCIUM 20 MILLIGRAM(S): 80 TABLET, FILM COATED ORAL at 21:22

## 2017-11-30 RX ADMIN — HEPARIN SODIUM 5000 UNIT(S): 5000 INJECTION INTRAVENOUS; SUBCUTANEOUS at 15:17

## 2017-11-30 RX ADMIN — Medication 50 MILLIGRAM(S): at 21:22

## 2017-11-30 RX ADMIN — Medication 1: at 08:23

## 2017-11-30 RX ADMIN — Medication 500 MILLIGRAM(S): at 07:10

## 2017-11-30 RX ADMIN — Medication 500 MILLIGRAM(S): at 06:37

## 2017-11-30 RX ADMIN — Medication 300 MILLILITER(S): at 10:40

## 2017-11-30 RX ADMIN — Medication 500 MILLIGRAM(S): at 17:27

## 2017-11-30 RX ADMIN — Medication 500 MILLIGRAM(S): at 17:57

## 2017-11-30 RX ADMIN — Medication 50 MILLIGRAM(S): at 08:26

## 2017-11-30 NOTE — DISCHARGE NOTE ADULT - PLAN OF CARE
resolution of symptoms pt able to ambulate  continue naproxen  follow up with rheum Coronary artery disease is a condition where the arteries the supply the heart muscle get clogges with fatty deposits & puts you at risk for a heart attack  Call your doctor if you have any new pain, pressure, or discomfort in the center of your chest, pain, tingling or discomfort in arms, back, neck, jaw, or stomach, shortness of breath, nausea, vomiting, burping or heartburn, sweating, cold and clammy skin, racing or abnormal heartbeat for more than 10 minutes or if they keep coming & going.  Call 911 and do not tr to get to hospital by care  You can help yourself with lefestyle changes (quitting smoking if you smoke), eat lots of fruits & vegetables & low fat dairy products, not a lot of meat & fatty foods, walk or some form of physical activity most days of the week, lose weight if you are overweight  Take your cardiac medication as prescribed to lower cholesterol, to lower blood pressure, aspirin to prevent blood clots, and diabetes control  Make sure to keep appointments with doctor for cardiac follow up care Follow up with your medical doctor to establish long term blood pressure treatment goals. follow up with neurosurgery HgA1C this admission.  Make sure you get your HgA1c checked every three months.  If you take oral diabetes medications, check your blood glucose two times a day.  If you take insulin, check your blood glucose before meals and at bedtime.  It's important not to skip any meals.  Keep a log of your blood glucose results and always take it with you to your doctor appointments.  Keep a list of your current medications including injectables and over the counter medications and bring this medication list with you to all your doctor appointments.  If you have not seen your ophthalmologist this year call for appointment.  Check your feet daily for redness, sores, or openings. Do not self treat. If no improvement in two days call your primary care physician for an appointment.  Low blood sugar (hypoglycemia) is a blood sugar below 70mg/dl. Check your blood sugar if you feel signs/symptoms of hypoglycemia. If your blood sugar is below 70 take 15 grams of carbohydrates (ex 4 oz of apple juice, 3-4 glucose tablets, or 4-6 oz of regular soda) wait 15 minutes and repeat blood sugar to make sure it comes up above 70.  If your blood sugar is above 70 and you are due for a meal, have a meal.  If you are not due for a meal have a snack.  This snack helps keeps your blood sugar at a safe range. pt given a dose of mag citrate and lactulose with no results, She is declining an enema at this time as she will see her PMD in the morning.

## 2017-11-30 NOTE — DISCHARGE NOTE ADULT - CARE PROVIDER_API CALL
Rory Moran (MD), Neurological Surgery  [address] [place] [address] [address]  [place] [address]  Phone: [phone]  Fax: [phone]

## 2017-11-30 NOTE — PROGRESS NOTE ADULT - SUBJECTIVE AND OBJECTIVE BOX
CC:  Right ankle pain    SUBJECTIVE:  Reports decreased pain today.  Says she is able to walk on her heel.  Reports right foot feeling a little tight this morning.  No SOB.  No N/V.  Reports constipation (last BM 5 days ago), but does say that this is a chronic issue.  NAD.    MEDICATIONS  (STANDING):  amLODIPine   Tablet 10 milliGRAM(s) Oral daily  aspirin  chewable 81 milliGRAM(s) Oral daily  atorvastatin 20 milliGRAM(s) Oral at bedtime  dextrose 5%. 1000 milliLiter(s) (50 mL/Hr) IV Continuous <Continuous>  dextrose 50% Injectable 12.5 Gram(s) IV Push once  dextrose 50% Injectable 25 Gram(s) IV Push once  dextrose 50% Injectable 25 Gram(s) IV Push once  heparin  Injectable 5000 Unit(s) SubCutaneous every 8 hours  insulin lispro (HumaLOG) corrective regimen sliding scale   SubCutaneous three times a day before meals  insulin lispro (HumaLOG) corrective regimen sliding scale   SubCutaneous at bedtime  lisinopril 2.5 milliGRAM(s) Oral daily  metoprolol     tartrate 50 milliGRAM(s) Oral two times a day  montelukast 10 milliGRAM(s) Oral daily  naproxen 500 milliGRAM(s) Oral two times a day  tiotropium 18 MICROgram(s) Capsule 1 Capsule(s) Inhalation daily    MEDICATIONS  (PRN):  acetaminophen   Tablet. 650 milliGRAM(s) Oral every 6 hours PRN mild moderate or severe pain  dextrose Gel 1 Dose(s) Oral once PRN Blood Glucose LESS THAN 70 milliGRAM(s)/deciliter  glucagon  Injectable 1 milliGRAM(s) IntraMuscular once PRN Glucose LESS THAN 70 milligrams/deciliter      Vital Signs Last 24 Hrs  T(C): 36.8 (30 Nov 2017 05:06), Max: 36.9 (29 Nov 2017 19:50)  T(F): 98.2 (30 Nov 2017 05:06), Max: 98.4 (29 Nov 2017 19:50)  HR: 54 (30 Nov 2017 05:06) (54 - 81)  BP: 118/67 (30 Nov 2017 05:06) (118/67 - 127/76)  BP(mean): --  RR: 18 (30 Nov 2017 05:06) (18 - 18)  SpO2: 98% (30 Nov 2017 05:06) (97% - 99%)    CAPILLARY BLOOD GLUCOSE      POCT Blood Glucose.: 181 mg/dL (30 Nov 2017 12:07)  POCT Blood Glucose.: 171 mg/dL (30 Nov 2017 07:45)  POCT Blood Glucose.: 172 mg/dL (29 Nov 2017 22:00)  POCT Blood Glucose.: 139 mg/dL (29 Nov 2017 18:23)    I&O's Summary      PHYSICAL EXAM:  GENERAL: Looks stated age, NAD.  CARDIOVASCULAR: Normal S1, S2.  PULMONARY: Lungs clear to auscultation bilaterally. No wheezes/rales/rhonchi.  GI: Abdomen soft, Nontender, Nondistended; Bowel sounds present.  MSK/Ext:  Mild TTP beneath the right medial malleolus.  No right foot swelling, warmth, or erythema.  No leg edema.  No calf tenderness bilaterally  PSYCH: Normal Affect.      LABS:                        10.5   6.77  )-----------( 350      ( 30 Nov 2017 08:51 )             32.1     11-30    143  |  106  |  11  ----------------------------<  146<H>  3.8   |  26  |  0.46<L>    Ca    9.1      30 Nov 2017 08:53  Phos  3.8     11-29  Mg     1.6     11-29    TPro  6.9  /  Alb  3.7  /  TBili  0.9  /  DBili  x   /  AST  13  /  ALT  16  /  AlkPhos  76  11-29              RADIOLOGY & ADDITIONAL TESTS:    < from: [facility] Duplex Lower Ext Vein Scan, Right (11.29.17 @ 11:12) >  IMPRESSION:     No evidence of right lower extremity deep venous thrombosis.    < end of copied text >

## 2017-11-30 NOTE — PROGRESS NOTE ADULT - ASSESSMENT
61yoF hx of CAD, HTN, T2DM, spinal stenosis s/p laminectomy on 11/17, presented on 11/28 with MEDIAL R ankle pain x 4 days.   Rheum consulted for further evaluation.  Physical exam is suggestive of posterior tibial tendonitis vs strain of deltoid ligament vs hairline fracture    # Recommendations -- to be discussed w fellow/attending  1) R ankle medial pain - pt has localized pain of R ankle, starting acutely a few days after hospitalization s/p laminectomy, conceivably associated w change in gait following surgery.  She also reported an event in summer 2017 where both ankles turned when she slipped. This could localize the pain/sx to the post. tibial tendon vs deltoid ligament.   There is no evident joint effusion, warmth, or erythema which would be suggestive of underlying crystalline arthropathy or infection. Pt has no hx of gout and also has normal uric acid level.   She denies any allergies to NSAIDs.   - Continue naproxen 500mg BID  - Will f/u MRI Ankle    2) R dorsal foot tightness - temporally related to R ankle pain; improved from its onset on 11/25    3) R calf mild edema, tenderness - VA duplex 11/29 negative for DVT; monitor for worsened swelling/tenderness    4) Constipation  - Start senna, polyethylene glycol, and/or mineral oil; encourage out of bed to chair    5) Allergies  - Patient says that she had allergic-type reaction during recent operation and that she was advised to add iodine to her list of allergies 61yoF hx of CAD, HTN, T2DM, spinal stenosis s/p laminectomy on 11/17, presented on 11/28 with MEDIAL R ankle pain x 4 days.   Rheum consulted for further evaluation.  Physical exam is suggestive of posterior tibial tendonitis vs strain of deltoid ligament vs hairline fracture. Pain improved since 11/29 in setting of receiving NSAIDs.     # Recommendations -- to be discussed w fellow/attending  1) R ankle medial pain - pt has localized pain of R ankle, starting acutely a few days after hospitalization s/p laminectomy, conceivably associated w change in gait following surgery.  She also reported an event in summer 2017 where both ankles inverted when she slipped; seems more likely that this would cause lateral damage but could somehow be related to her medial ankle pain  There is no evident joint effusion, warmth, or erythema which would be suggestive of underlying crystalline arthropathy or infection. Pt has no hx of gout and also has normal uric acid level.   She denies any allergies to NSAIDs.   - Continue naproxen 500mg BID  - Will f/u MRI Ankle    2) R dorsal foot tightness - temporally related to R ankle pain; improved from its onset on 11/25; mgmt as above    3) R calf mild edema, tenderness - VA duplex 11/29 negative for DVT; monitor for worsened swelling/tenderness    4) Constipation - Magnesium citrate + other laxatives if needed, per primary team     5) Allergies - Patient says that she had a local rash after receiving topical iodine during recent back operation and that surgeon Dr. Moran advised her to add iodine to her list of allergies

## 2017-11-30 NOTE — DISCHARGE NOTE ADULT - MEDICATION SUMMARY - MEDICATIONS TO TAKE
I will START or STAY ON the medications listed below when I get home from the hospital:    Rolling Walker  -- Use as directed  Dx: Spinal stenosis, s/p L4-S1 Laminectomy  -- Indication: For walker    acetaminophen 325 mg oral tablet  -- 2 tab(s) by mouth every 6 hours, As needed, Mild Pain (1 - 3)  -- Indication: For Pain    naproxen 500 mg oral tablet  -- 1 tab(s) by mouth 2 times a day  -- Indication: For Pain    aspirin 81 mg oral delayed release tablet  -- 1 tab(s) by mouth once a day  -- Indication: For Cad    lisinopril 2.5 mg oral tablet  -- 1 tab(s) by mouth once a day  -- Indication: For Htn    glipiZIDE 10 mg oral tablet  -- 1 tab(s) by mouth once a day  -- Indication: For dm    metFORMIN 1000 mg oral tablet  -- 1 tab(s) by mouth 2 times a day  -- Indication: For dm    rosuvastatin 5 mg oral tablet  -- 1 tab(s) by mouth once a day (at bedtime)  -- Indication: For Hld    metoprolol tartrate 50 mg oral tablet  -- 1 tab(s) by mouth 2 times a day  -- Indication: For Htn    Spiriva Respimat 2.5 mcg/inh inhalation aerosol  -- 2 puff(s) inhaled once a day  -- Indication: For Asthma    amLODIPine 10 mg oral tablet  -- 1 tab(s) by mouth once a day  -- Indication: For Htn    EpiPen 2-[place] 0.3 mg injectable kit  -- 0.3 milligram(s) injectable 1 to 2 times a day   -- Obtain medical advice before taking any non-prescription drugs as some may affect the action of this medication.    -- Indication: For Prophylactic measure    montelukast 10 mg oral tablet  -- 1 tab(s) by mouth once a day  -- Indication: For Asthma

## 2017-11-30 NOTE — DISCHARGE NOTE ADULT - CARE PLAN
Principal Discharge DX:	Acute right ankle pain  Goal:	resolution of symptoms  Instructions for follow-up, activity and diet:	pt able to ambulate  continue naproxen  follow up with rheum  Secondary Diagnosis:	Coronary artery disease involving native coronary artery of native heart without angina pectoris  Instructions for follow-up, activity and diet:	Coronary artery disease is a condition where the arteries the supply the heart muscle get clogges with fatty deposits & puts you at risk for a heart attack  Call your doctor if you have any new pain, pressure, or discomfort in the center of your chest, pain, tingling or discomfort in arms, back, neck, jaw, or stomach, shortness of breath, nausea, vomiting, burping or heartburn, sweating, cold and clammy skin, racing or abnormal heartbeat for more than 10 minutes or if they keep coming & going.  Call 911 and do not tr to get to hospital by care  You can help yourself with lefestyle changes (quitting smoking if you smoke), eat lots of fruits & vegetables & low fat dairy products, not a lot of meat & fatty foods, walk or some form of physical activity most days of the week, lose weight if you are overweight  Take your cardiac medication as prescribed to lower cholesterol, to lower blood pressure, aspirin to prevent blood clots, and diabetes control  Make sure to keep appointments with doctor for cardiac follow up care  Secondary Diagnosis:	Essential hypertension  Instructions for follow-up, activity and diet:	Follow up with your medical doctor to establish long term blood pressure treatment goals.  Secondary Diagnosis:	Spinal stenosis of lumbosacral region  Instructions for follow-up, activity and diet:	follow up with neurosurgery  Secondary Diagnosis:	Type 2 diabetes mellitus without complication, without long-term current use of insulin  Instructions for follow-up, activity and diet:	HgA1C this admission.  Make sure you get your HgA1c checked every three months.  If you take oral diabetes medications, check your blood glucose two times a day.  If you take insulin, check your blood glucose before meals and at bedtime.  It's important not to skip any meals.  Keep a log of your blood glucose results and always take it with you to your doctor appointments.  Keep a list of your current medications including injectables and over the counter medications and bring this medication list with you to all your doctor appointments.  If you have not seen your ophthalmologist this year call for appointment.  Check your feet daily for redness, sores, or openings. Do not self treat. If no improvement in two days call your primary care physician for an appointment.  Low blood sugar (hypoglycemia) is a blood sugar below 70mg/dl. Check your blood sugar if you feel signs/symptoms of hypoglycemia. If your blood sugar is below 70 take 15 grams of carbohydrates (ex 4 oz of apple juice, 3-4 glucose tablets, or 4-6 oz of regular soda) wait 15 minutes and repeat blood sugar to make sure it comes up above 70.  If your blood sugar is above 70 and you are due for a meal, have a meal.  If you are not due for a meal have a snack.  This snack helps keeps your blood sugar at a safe range. Principal Discharge DX:	Acute right ankle pain  Goal:	resolution of symptoms  Instructions for follow-up, activity and diet:	pt able to ambulate  continue naproxen  follow up with rheum  Secondary Diagnosis:	Coronary artery disease involving native coronary artery of native heart without angina pectoris  Instructions for follow-up, activity and diet:	Coronary artery disease is a condition where the arteries the supply the heart muscle get clogges with fatty deposits & puts you at risk for a heart attack  Call your doctor if you have any new pain, pressure, or discomfort in the center of your chest, pain, tingling or discomfort in arms, back, neck, jaw, or stomach, shortness of breath, nausea, vomiting, burping or heartburn, sweating, cold and clammy skin, racing or abnormal heartbeat for more than 10 minutes or if they keep coming & going.  Call 911 and do not tr to get to hospital by care  You can help yourself with lefestyle changes (quitting smoking if you smoke), eat lots of fruits & vegetables & low fat dairy products, not a lot of meat & fatty foods, walk or some form of physical activity most days of the week, lose weight if you are overweight  Take your cardiac medication as prescribed to lower cholesterol, to lower blood pressure, aspirin to prevent blood clots, and diabetes control  Make sure to keep appointments with doctor for cardiac follow up care  Secondary Diagnosis:	Essential hypertension  Instructions for follow-up, activity and diet:	Follow up with your medical doctor to establish long term blood pressure treatment goals.  Secondary Diagnosis:	Spinal stenosis of lumbosacral region  Instructions for follow-up, activity and diet:	follow up with neurosurgery  Secondary Diagnosis:	Type 2 diabetes mellitus without complication, without long-term current use of insulin  Instructions for follow-up, activity and diet:	HgA1C this admission.  Make sure you get your HgA1c checked every three months.  If you take oral diabetes medications, check your blood glucose two times a day.  If you take insulin, check your blood glucose before meals and at bedtime.  It's important not to skip any meals.  Keep a log of your blood glucose results and always take it with you to your doctor appointments.  Keep a list of your current medications including injectables and over the counter medications and bring this medication list with you to all your doctor appointments.  If you have not seen your ophthalmologist this year call for appointment.  Check your feet daily for redness, sores, or openings. Do not self treat. If no improvement in two days call your primary care physician for an appointment.  Low blood sugar (hypoglycemia) is a blood sugar below 70mg/dl. Check your blood sugar if you feel signs/symptoms of hypoglycemia. If your blood sugar is below 70 take 15 grams of carbohydrates (ex 4 oz of apple juice, 3-4 glucose tablets, or 4-6 oz of regular soda) wait 15 minutes and repeat blood sugar to make sure it comes up above 70.  If your blood sugar is above 70 and you are due for a meal, have a meal.  If you are not due for a meal have a snack.  This snack helps keeps your blood sugar at a safe range.  Secondary Diagnosis:	Constipation  Instructions for follow-up, activity and diet:	pt given a dose of mag citrate and lactulose with no results, She is declining an enema at this time as she will see her PMD in the morning.

## 2017-11-30 NOTE — DISCHARGE NOTE ADULT - SECONDARY DIAGNOSIS.
Coronary artery disease involving native coronary artery of native heart without angina pectoris Essential hypertension Spinal stenosis of lumbosacral region Type 2 diabetes mellitus without complication, without long-term current use of insulin Constipation

## 2017-11-30 NOTE — DISCHARGE NOTE ADULT - PATIENT PORTAL LINK FT
“You can access the [facility] Patient Portal, offered by [facility], by registering with the following website: [email or web address]”

## 2017-11-30 NOTE — DISCHARGE NOTE ADULT - ADDITIONAL INSTRUCTIONS
Follow up with neurosurgery  follow up with rheumatology within 1 week  follow up with pMD within 1 week

## 2017-11-30 NOTE — DISCHARGE NOTE ADULT - HOSPITAL COURSE
The patient is a 61-year-old woman with PMH of spinal stenosis and herniated disc s/p lumbo-sacral laminectomy on 11/17/17 (complicated by an allergic reaction to percocet), CAD s/p stent, Type 2 DM, HTN, HLD, and mild asthma, who presented with 3 days of progressive right ankle pain and difficulty ambulating.  The patient denied any recent trauma or injury.    In the ED, ankle x-rays were negative and the patient was seen by neurosurgery who felt that the pain did not appear to be related to the recent surgery.  The patient was admitted to the medicine service.  Dopplers were done and were negative for a right LE DVT.  An MRI was ordered and a rheumatology consult was called.  Rheumatology felt that the pain may be secondary to posterior tibial tendonitis and recommended adding NSAIDs while awaiting the MRI.    The MRI showed The patient is a 61-year-old woman with PMH of spinal stenosis and herniated disc s/p lumbo-sacral laminectomy on 11/17/17 (complicated by an allergic reaction to percocet), CAD s/p stent, Type 2 DM, HTN, HLD, and mild asthma, who presented with 3 days of progressive right ankle pain and difficulty ambulating.  The patient denied any recent trauma or injury.    In the ED, ankle x-rays were negative and the patient was seen by neurosurgery who felt that the pain did not appear to be related to the recent surgery.  The patient was admitted to the medicine service.  Dopplers were done and were negative for a right LE DVT.  An MRI was ordered and a rheumatology consult was called.  Rheumatology felt that the pain may be secondary to posterior tibial tendonitis and recommended adding NSAIDs while awaiting the MRI.    The MRI showed muscular atrophy and tenosynovitis. The MRI was looked over by rheumatology and the atrophy was thought to be secondary to previous ankle injury. She is much improved and stable to d/c with rheumatology f/u 1-2 weeks

## 2017-11-30 NOTE — PROGRESS NOTE ADULT - SUBJECTIVE AND OBJECTIVE BOX
MARIZA [facility]  [id number]    INTERVAL HPI/OVERNIGHT EVENTS:  R ankle pain improved. Was able to "hobble" to bathroom more easily than she was walking y'day; still walking on R heel rather than full foot. Has a feeling of "tightness" on R dorsal foot from toes up to distal shin; similar to but less severe than the feeling of tightness that she had on 11/25  MRI ankle has not been performed; patient was told that it is scheduled for tonight at 19:15.     MEDICATIONS  (STANDING):  amLODIPine   Tablet 10 milliGRAM(s) Oral daily  aspirin  chewable 81 milliGRAM(s) Oral daily  atorvastatin 20 milliGRAM(s) Oral at bedtime  dextrose 5%. 1000 milliLiter(s) (50 mL/Hr) IV Continuous <Continuous>  dextrose 50% Injectable 12.5 Gram(s) IV Push once  dextrose 50% Injectable 25 Gram(s) IV Push once  dextrose 50% Injectable 25 Gram(s) IV Push once  heparin  Injectable 5000 Unit(s) SubCutaneous every 8 hours  insulin lispro (HumaLOG) corrective regimen sliding scale   SubCutaneous three times a day before meals  insulin lispro (HumaLOG) corrective regimen sliding scale   SubCutaneous at bedtime  lisinopril 2.5 milliGRAM(s) Oral daily  magnesium citrate Solution 300 milliLiter(s) Oral once  metoprolol     tartrate 50 milliGRAM(s) Oral two times a day  montelukast 10 milliGRAM(s) Oral daily  naproxen 500 milliGRAM(s) Oral two times a day  tiotropium 18 MICROgram(s) Capsule 1 Capsule(s) Inhalation daily    MEDICATIONS  (PRN):  acetaminophen   Tablet. 650 milliGRAM(s) Oral every 6 hours PRN mild moderate or severe pain  dextrose Gel 1 Dose(s) Oral once PRN Blood Glucose LESS THAN 70 milliGRAM(s)/deciliter  glucagon  Injectable 1 milliGRAM(s) IntraMuscular once PRN Glucose LESS THAN 70 milligrams/deciliter      Allergies    penicillin (Pruritus; Rash)  Percocet 10/325 (Anaphylaxis)  sulfa drugs (Pruritus; Rash)    Intolerances        Review of Systems:   General: No fevers or chills. Requested sleeping pill last night, received zaleplon, slept well  CVS: no chest pain  Resp: no SOB  GI: +constipated (LBM 11/25). no n/v  MSK: no pain in other joints  Skin: no itching or rash  Neuro: no numbness or tingling    Vital Signs Last 24 Hrs  T(C): 36.8 (30 Nov 2017 05:06), Max: 36.9 (29 Nov 2017 19:50)  T(F): 98.2 (30 Nov 2017 05:06), Max: 98.4 (29 Nov 2017 19:50)  HR: 54 (30 Nov 2017 05:06) (54 - 81)  BP: 118/67 (30 Nov 2017 05:06) (118/67 - 127/76)  BP(mean): --  RR: 18 (30 Nov 2017 05:06) (18 - 18)  SpO2: 98% (30 Nov 2017 05:06) (97% - 99%)    Physical Exam:  General: NAD, breathing comfortably on room air  Cardio: RRR  Vascular: R DP pulse non-palpable, R PT pulse 1+. L DP pulse 2+, L PT pulse non-palpable. Cap refill in big toes similar b/l.    Resp: Adequate air mvmt b/l, no wheeze  Abd: Soft, obese, non-tender  MSK: R medial calf tenderness up to knee, w mild pitting edema on R. R ankle tenderness worst immediately inferior and infero-anteriorly to medial malleolus, also at medial malleolus itself. No tenderness on lateral foot or sole of foot. This examiner unable to perceive swelling or warmth of R ankle relative to L. Pain w both dorsiflexion and plantar-flexion that pt says is less severe than pain with those movement's y'day.   Neuro: moves all four extremities   Psych: calm coherent goal-oriented     LABS:                        10.5   6.77  )-----------( 350      ( 30 Nov 2017 08:51 )             32.1     11-29    145  |  105  |  12  ----------------------------<  102<H>  3.5   |  26  |  0.68    Ca    9.1      29 Nov 2017 02:26  Phos  3.8     11-29  Mg     1.6     11-29    TPro  6.9  /  Alb  3.7  /  TBili  0.9  /  DBili  x   /  AST  13  /  ALT  16  /  AlkPhos  76  11-29      RADIOLOGY & ADDITIONAL TESTS:  VA Duplex RLE Venous 2017-11-29  "IMPRESSION:   No evidence of right lower extremity deep venous thrombosis."    XRay Ankle Complete, 3 Views, R 2017-11-28  "Impression:  No acute fractures or dislocations. The joint spaces are preserved.   Plantar and Achilles calcaneal enthesophytes. Soft tissues are   unremarkable." MARIZA [facility]  [id number]    INTERVAL HPI/OVERNIGHT EVENTS:  R ankle pain improved. Was able to "hobble" to bathroom more easily than she was walking y'day; still walking on R heel rather than full foot. Has a feeling of "tightness" on R dorsal foot from toes up to distal shin; similar to but less severe than the feeling of tightness that she had on 11/25  MRI ankle has not been performed; patient was told that it is scheduled for tonight at 19:15.     MEDICATIONS  (STANDING):  amLODIPine   Tablet 10 milliGRAM(s) Oral daily  aspirin  chewable 81 milliGRAM(s) Oral daily  atorvastatin 20 milliGRAM(s) Oral at bedtime  dextrose 5%. 1000 milliLiter(s) (50 mL/Hr) IV Continuous <Continuous>  dextrose 50% Injectable 12.5 Gram(s) IV Push once  dextrose 50% Injectable 25 Gram(s) IV Push once  dextrose 50% Injectable 25 Gram(s) IV Push once  heparin  Injectable 5000 Unit(s) SubCutaneous every 8 hours  insulin lispro (HumaLOG) corrective regimen sliding scale   SubCutaneous three times a day before meals  insulin lispro (HumaLOG) corrective regimen sliding scale   SubCutaneous at bedtime  lisinopril 2.5 milliGRAM(s) Oral daily  magnesium citrate Solution 300 milliLiter(s) Oral once  metoprolol     tartrate 50 milliGRAM(s) Oral two times a day  montelukast 10 milliGRAM(s) Oral daily  naproxen 500 milliGRAM(s) Oral two times a day  tiotropium 18 MICROgram(s) Capsule 1 Capsule(s) Inhalation daily    MEDICATIONS  (PRN):  acetaminophen   Tablet. 650 milliGRAM(s) Oral every 6 hours PRN mild moderate or severe pain  dextrose Gel 1 Dose(s) Oral once PRN Blood Glucose LESS THAN 70 milliGRAM(s)/deciliter  glucagon  Injectable 1 milliGRAM(s) IntraMuscular once PRN Glucose LESS THAN 70 milligrams/deciliter      Allergies    penicillin (Pruritus; Rash)  Percocet 10/325 (Anaphylaxis)  sulfa drugs (Pruritus; Rash)    Intolerances        Review of Systems:   General: No fevers or chills. Requested sleeping pill last night, received zaleplon, slept well  CVS: no chest pain  Resp: no SOB  GI: +constipated (LBM 11/25). no n/v  MSK: no pain in other joints  Skin: no itching or rash  Neuro: no numbness or tingling    Vital Signs Last 24 Hrs  T(C): 36.8 (30 Nov 2017 05:06), Max: 36.9 (29 Nov 2017 19:50)  T(F): 98.2 (30 Nov 2017 05:06), Max: 98.4 (29 Nov 2017 19:50)  HR: 54 (30 Nov 2017 05:06) (54 - 81)  BP: 118/67 (30 Nov 2017 05:06) (118/67 - 127/76)  BP(mean): --  RR: 18 (30 Nov 2017 05:06) (18 - 18)  SpO2: 98% (30 Nov 2017 05:06) (97% - 99%)    Physical Exam:  General: NAD, breathing comfortably on room air  Cardio: RRR  Vascular: R DP pulse non-palpable, R PT pulse 1+. L DP pulse 2+, L PT pulse non-palpable. Cap refill in big toes similar b/l.    Resp: Adequate air mvmt b/l, no wheeze  Abd: Soft, obese, non-tender  MSK: R medial calf tenderness up to knee, w mild pitting edema on R. R ankle tenderness worst immediately inferior and infero-anteriorly to medial malleolus, also at medial malleolus itself. No tenderness on lateral foot or sole of foot. This examiner unable to perceive swelling or warmth of R ankle relative to L. Pain w both dorsiflexion and plantar-flexion that pt says is less severe than pain with those movement's y'day. Eversion feels limited by tightness but not pain; no pain w inversion (but inversion on R was painful y'day).   Neuro: perception of touch of examiner's hand grossly similar between both feet and legs   Psych: calm coherent goal-oriented     LABS:                        10.5   6.77  )-----------( 350      ( 30 Nov 2017 08:51 )             32.1     11-29    145  |  105  |  12  ----------------------------<  102<H>  3.5   |  26  |  0.68    Ca    9.1      29 Nov 2017 02:26  Phos  3.8     11-29  Mg     1.6     11-29    TPro  6.9  /  Alb  3.7  /  TBili  0.9  /  DBili  x   /  AST  13  /  ALT  16  /  AlkPhos  76  11-29      RADIOLOGY & ADDITIONAL TESTS:  VA Duplex RLE Venous 2017-11-29  "IMPRESSION:   No evidence of right lower extremity deep venous thrombosis."    XRay Ankle Complete, 3 Views, R 2017-11-28  "Impression:  No acute fractures or dislocations. The joint spaces are preserved.   Plantar and Achilles calcaneal enthesophytes. Soft tissues are   unremarkable."

## 2017-12-01 VITALS
RESPIRATION RATE: 18 BRPM | TEMPERATURE: 98 F | HEART RATE: 76 BPM | SYSTOLIC BLOOD PRESSURE: 117 MMHG | DIASTOLIC BLOOD PRESSURE: 76 MMHG | OXYGEN SATURATION: 100 %

## 2017-12-01 LAB
ANION GAP SERPL CALC-SCNC: 15 MMOL/L — SIGNIFICANT CHANGE UP (ref 5–17)
BUN SERPL-MCNC: 12 MG/DL — SIGNIFICANT CHANGE UP (ref 7–23)
CALCIUM SERPL-MCNC: 9.6 MG/DL — SIGNIFICANT CHANGE UP (ref 8.4–10.5)
CHLORIDE SERPL-SCNC: 104 MMOL/L — SIGNIFICANT CHANGE UP (ref 96–108)
CO2 SERPL-SCNC: 25 MMOL/L — SIGNIFICANT CHANGE UP (ref 22–31)
CREAT SERPL-MCNC: 0.52 MG/DL — SIGNIFICANT CHANGE UP (ref 0.5–1.3)
GLUCOSE BLDC GLUCOMTR-MCNC: 176 MG/DL — HIGH (ref 70–99)
GLUCOSE SERPL-MCNC: 159 MG/DL — HIGH (ref 70–99)
HCT VFR BLD CALC: 36.6 % — SIGNIFICANT CHANGE UP (ref 34.5–45)
HGB BLD-MCNC: 12.2 G/DL — SIGNIFICANT CHANGE UP (ref 11.5–15.5)
MCHC RBC-ENTMCNC: 30.3 PG — SIGNIFICANT CHANGE UP (ref 27–34)
MCHC RBC-ENTMCNC: 33.3 GM/DL — SIGNIFICANT CHANGE UP (ref 32–36)
MCV RBC AUTO: 90.8 FL — SIGNIFICANT CHANGE UP (ref 80–100)
PLATELET # BLD AUTO: 409 K/UL — HIGH (ref 150–400)
POTASSIUM SERPL-MCNC: 4.3 MMOL/L — SIGNIFICANT CHANGE UP (ref 3.5–5.3)
POTASSIUM SERPL-SCNC: 4.3 MMOL/L — SIGNIFICANT CHANGE UP (ref 3.5–5.3)
RBC # BLD: 4.03 M/UL — SIGNIFICANT CHANGE UP (ref 3.8–5.2)
RBC # FLD: 13 % — SIGNIFICANT CHANGE UP (ref 10.3–14.5)
SODIUM SERPL-SCNC: 144 MMOL/L — SIGNIFICANT CHANGE UP (ref 135–145)
WBC # BLD: 7.84 K/UL — SIGNIFICANT CHANGE UP (ref 3.8–10.5)
WBC # FLD AUTO: 7.84 K/UL — SIGNIFICANT CHANGE UP (ref 3.8–10.5)

## 2017-12-01 PROCEDURE — 85379 FIBRIN DEGRADATION QUANT: CPT

## 2017-12-01 PROCEDURE — 99285 EMERGENCY DEPT VISIT HI MDM: CPT | Mod: 25

## 2017-12-01 PROCEDURE — 85027 COMPLETE CBC AUTOMATED: CPT

## 2017-12-01 PROCEDURE — 99239 HOSP IP/OBS DSCHRG MGMT >30: CPT

## 2017-12-01 PROCEDURE — 82962 GLUCOSE BLOOD TEST: CPT

## 2017-12-01 PROCEDURE — 73610 X-RAY EXAM OF ANKLE: CPT

## 2017-12-01 PROCEDURE — 84100 ASSAY OF PHOSPHORUS: CPT

## 2017-12-01 PROCEDURE — 94640 AIRWAY INHALATION TREATMENT: CPT

## 2017-12-01 PROCEDURE — 80048 BASIC METABOLIC PNL TOTAL CA: CPT

## 2017-12-01 PROCEDURE — 93971 EXTREMITY STUDY: CPT

## 2017-12-01 PROCEDURE — 73721 MRI JNT OF LWR EXTRE W/O DYE: CPT

## 2017-12-01 PROCEDURE — 85652 RBC SED RATE AUTOMATED: CPT

## 2017-12-01 PROCEDURE — 84550 ASSAY OF BLOOD/URIC ACID: CPT

## 2017-12-01 PROCEDURE — 83735 ASSAY OF MAGNESIUM: CPT

## 2017-12-01 PROCEDURE — 80053 COMPREHEN METABOLIC PANEL: CPT

## 2017-12-01 RX ORDER — MONTELUKAST 4 MG/1
1 TABLET, CHEWABLE ORAL
Qty: 30 | Refills: 0 | OUTPATIENT
Start: 2017-12-01 | End: 2017-12-31

## 2017-12-01 RX ORDER — MULTIVIT WITH MIN/MFOLATE/K2 340-15/3 G
300 POWDER (GRAM) ORAL ONCE
Qty: 0 | Refills: 0 | Status: COMPLETED | OUTPATIENT
Start: 2017-12-01 | End: 2017-12-01

## 2017-12-01 RX ORDER — LACTULOSE 10 G/15ML
20 SOLUTION ORAL ONCE
Qty: 0 | Refills: 0 | Status: COMPLETED | OUTPATIENT
Start: 2017-12-01 | End: 2017-12-01

## 2017-12-01 RX ADMIN — TIOTROPIUM BROMIDE 1 CAPSULE(S): 18 CAPSULE ORAL; RESPIRATORY (INHALATION) at 11:11

## 2017-12-01 RX ADMIN — Medication 500 MILLIGRAM(S): at 17:36

## 2017-12-01 RX ADMIN — Medication 500 MILLIGRAM(S): at 05:52

## 2017-12-01 RX ADMIN — Medication 500 MILLIGRAM(S): at 18:06

## 2017-12-01 RX ADMIN — AMLODIPINE BESYLATE 10 MILLIGRAM(S): 2.5 TABLET ORAL at 05:51

## 2017-12-01 RX ADMIN — Medication 3: at 12:51

## 2017-12-01 RX ADMIN — Medication 1: at 08:45

## 2017-12-01 RX ADMIN — Medication 81 MILLIGRAM(S): at 11:11

## 2017-12-01 RX ADMIN — HEPARIN SODIUM 5000 UNIT(S): 5000 INJECTION INTRAVENOUS; SUBCUTANEOUS at 05:52

## 2017-12-01 RX ADMIN — LACTULOSE 20 GRAM(S): 10 SOLUTION ORAL at 11:08

## 2017-12-01 RX ADMIN — Medication 50 MILLIGRAM(S): at 08:45

## 2017-12-01 RX ADMIN — MONTELUKAST 10 MILLIGRAM(S): 4 TABLET, CHEWABLE ORAL at 11:11

## 2017-12-01 RX ADMIN — LISINOPRIL 2.5 MILLIGRAM(S): 2.5 TABLET ORAL at 05:51

## 2017-12-01 RX ADMIN — HEPARIN SODIUM 5000 UNIT(S): 5000 INJECTION INTRAVENOUS; SUBCUTANEOUS at 13:02

## 2017-12-01 NOTE — PROGRESS NOTE ADULT - PROBLEM SELECTOR PROBLEM 6
Coronary artery disease involving native coronary artery of native heart without angina pectoris
Coronary artery disease involving native coronary artery of native heart without angina pectoris
Mild intermittent asthma without complication

## 2017-12-01 NOTE — PROGRESS NOTE ADULT - SUBJECTIVE AND OBJECTIVE BOX
Patient is a 61y old  Female who presents with a chief complaint of R ankle pain (30 Nov 2017 12:15)      SUBJECTIVE / OVERNIGHT EVENTS:    patient feels well. walking on her r ankle. pain is near to 0. has not had BM in a while.    MEDICATIONS  (STANDING):  amLODIPine   Tablet 10 milliGRAM(s) Oral daily  aspirin  chewable 81 milliGRAM(s) Oral daily  atorvastatin 20 milliGRAM(s) Oral at bedtime  dextrose 5%. 1000 milliLiter(s) (50 mL/Hr) IV Continuous <Continuous>  dextrose 50% Injectable 12.5 Gram(s) IV Push once  dextrose 50% Injectable 25 Gram(s) IV Push once  dextrose 50% Injectable 25 Gram(s) IV Push once  heparin  Injectable 5000 Unit(s) SubCutaneous every 8 hours  insulin lispro (HumaLOG) corrective regimen sliding scale   SubCutaneous three times a day before meals  insulin lispro (HumaLOG) corrective regimen sliding scale   SubCutaneous at bedtime  lactulose Syrup 20 Gram(s) Oral once  lisinopril 2.5 milliGRAM(s) Oral daily  metoprolol     tartrate 50 milliGRAM(s) Oral two times a day  montelukast 10 milliGRAM(s) Oral daily  naproxen 500 milliGRAM(s) Oral two times a day  tiotropium 18 MICROgram(s) Capsule 1 Capsule(s) Inhalation daily    MEDICATIONS  (PRN):  acetaminophen   Tablet. 650 milliGRAM(s) Oral every 6 hours PRN mild moderate or severe pain  dextrose Gel 1 Dose(s) Oral once PRN Blood Glucose LESS THAN 70 milliGRAM(s)/deciliter  glucagon  Injectable 1 milliGRAM(s) IntraMuscular once PRN Glucose LESS THAN 70 milligrams/deciliter        CAPILLARY BLOOD GLUCOSE      POCT Blood Glucose.: 176 mg/dL (01 Dec 2017 08:43)  POCT Blood Glucose.: 147 mg/dL (30 Nov 2017 22:47)  POCT Blood Glucose.: 128 mg/dL (30 Nov 2017 16:41)  POCT Blood Glucose.: 181 mg/dL (30 Nov 2017 12:07)    I&O's Summary      PHYSICAL EXAM:  GENERAL: NAD, well-developed  HEAD:  Atraumatic, Normocephalic  EYES: EOMI, PERRLA, conjunctiva and sclera clear  NECK: Supple, No JVD  CHEST/LUNG: Clear to auscultation bilaterally; No wheeze  HEART: Regular rate and rhythm; No murmurs, rubs, or gallops  ABDOMEN: Soft, Nontender, Nondistended; Bowel sounds present  EXTREMITIES:  2+ Peripheral Pulses, No clubbing, cyanosis, or edema  PSYCH: AAOx3  NEUROLOGY: non-focal  SKIN: No rashes or lesions    LABS:                        10.5   6.77  )-----------( 350      ( 30 Nov 2017 08:51 )             32.1     12-01    144  |  104  |  12  ----------------------------<  159<H>  4.3   |  25  |  0.52    Ca    9.6      01 Dec 2017 09:03                  Care Discussed with Consultants/Other Providers:  NP

## 2017-12-01 NOTE — PROGRESS NOTE ADULT - PROBLEM SELECTOR PROBLEM 7
Mild intermittent asthma without complication
Mild intermittent asthma without complication
Prophylactic measure

## 2017-12-01 NOTE — PROGRESS NOTE ADULT - PROBLEM SELECTOR PROBLEM 5
Type 2 diabetes mellitus without complication, without long-term current use of insulin
Coronary artery disease involving native coronary artery of native heart without angina pectoris
Type 2 diabetes mellitus without complication, without long-term current use of insulin

## 2017-12-01 NOTE — DIETITIAN INITIAL EVALUATION ADULT. - ORAL INTAKE PTA
good/Pt reports good appetite and po intakes PTA, does not cook meals at home 2/2 back pain and usually orders in. Breakfast - granola bar with coffee, small fruit; Lunch - Chinese food (broccoli with brown rice) or prepared grilled chicken salads; Dinner - turkey burger, fish, sweet potato; Pt snacks on corn chips and only drinks diet beverages

## 2017-12-01 NOTE — DIETITIAN INITIAL EVALUATION ADULT. - OTHER INFO
Nutrition consult received for diabetes diet education. Pt reports UBW as 190 pounds, had successful intentional wt loss of 50 pounds in 3 years and wt has plateaued at 190 pounds. Pt's current dosing wt is stable at 188 pounds. Pt with good appetite and po intakes, 100% po intake noted at time of visit. Pt with constipation with last BM 1 week ago, passing gas, on bowel regimen. NKFA, but tried to avoid eating too much red meat. PTA takes multivitamin. Pt with T2DM, on Metformin and Glipizide, reports checking fingersticks 1 x week in the morning with numbers around 107-130mg/dL. Pt receptive to diet education provided.

## 2017-12-01 NOTE — PROGRESS NOTE ADULT - PROBLEM SELECTOR PROBLEM 4
Essential hypertension
Type 2 diabetes mellitus without complication, without long-term current use of insulin
Essential hypertension

## 2017-12-01 NOTE — PROGRESS NOTE ADULT - PROBLEM SELECTOR PLAN 8
Patient s/p laminectomy.  Seen by neurosurgery, no surgical intervention at this time.
Patient s/p laminectomy.  Seen by neurosurgery, no surgical intervention at this time.

## 2017-12-01 NOTE — DIETITIAN INITIAL EVALUATION ADULT. - PROBLEM SELECTOR PLAN 1
-etiology unclear at this point, neurosx recs appreciated does not appear to be directly related to prior well tolerated surgery.   -ddx includes but not limited to: ankle sprain vs. dvt vs. gout vs. OA vs. soft tissue/ligament injury  -obtain stat set of basic admission labs cbc/cmp/mag/phos  -check uric acid  -wells score for DVT = 1 for recent bedridden surgery. check D-dimer to r/o dvt, if + can get RLE doppler  -Given the fact that pain has been progressive, is reproducible on exam, had patient cannot bear weight or walk, and XR was nondiagnostic, will proceed to MRI R ankle. Consider ortho c/s in am if sx are persistent or any findings on MRI  -pain ctl with tylenol is working [id number] Detailed

## 2017-12-01 NOTE — CHART NOTE - NSCHARTNOTEFT_GEN_A_CORE
MRI reviewed- pt with evidence of tenosynovitis with muscular edema. Likely related to previous foot injury.  Pt improving on NSAIDs and is ambulating.   Can be d/c home on NSAIDS.    Information for outpt f/u provided to pt.

## 2017-12-01 NOTE — DIETITIAN INITIAL EVALUATION ADULT. - ADHERENCE
fair/Pt states she was advised to follow a stricter modified diet by MD for blood glucose control, states HgbA1c was as high as 10% months ago. Pt cut out all concentrated sweets, but admits to 'cheating' occasionally and has difficulty with portion controlling

## 2017-12-01 NOTE — PROGRESS NOTE ADULT - PROBLEM SELECTOR PLAN 2
Given Magnesium Citrate.  c/w senna  adding lactulose today and if needed enema
Patient s/p laminectomy.  No further pain.  Seen by neurosurgery, no surgical intervention at this time.
Given Magnesium Citrate.  Will also add daily Senna.

## 2017-12-01 NOTE — PROGRESS NOTE ADULT - PROBLEM SELECTOR PLAN 3
Continue Metoprolol, Norvasc, Lisinopril.
Mild.  Patient denies any signs of bleeding.
Mild.  Patient denies any signs of bleeding.  May be related to recent surgery.  Trend hemoglobin.

## 2017-12-01 NOTE — PROGRESS NOTE ADULT - PROBLEM SELECTOR PLAN 4
Continue Metoprolol, Norvasc, Lisinopril.
Continue Metoprolol, Norvasc, Lisinopril.
Holding oral hypoglycemics.  Continue sliding scale coverage.  A1C 8.1 earlier this month.

## 2017-12-01 NOTE — PROGRESS NOTE ADULT - PROBLEM SELECTOR PLAN 1
Etiology remains unclear.  Per neurosurgery, does not appear to be directly related to recent surgery.  Checking doppler and ankle MRI.  Gout also remains a concern despite normal uric acid level.  Will ask rheumatology to see on consult.
Rheumatology input appreciated and case d/w Dr. Munson.  Suspect posterior tibial tendonitis.  Continue Naproxen 100mg bid  Await right ankle MRI, if negative would pursue PT eval and discharge planning.
Rheumatology input appreciated and case d/w Dr. Munson.  Suspect posterior tibial tendonitis.  Continue Naproxen.  Await right ankle MRI, if negative would pursue PT eval and discharge planning.

## 2017-12-01 NOTE — DIETITIAN INITIAL EVALUATION ADULT. - ENERGY NEEDS
Height: 62 inches, Weight: 188 pounds  BMI: 34 kg/m2 IBW: 110 pounds (+/-10%), %IBW: 171%  Pertinent Info: Per chart, 60 y/o female with PMH of T2DM, CAD, HTN, HLD, spinal stenosis s/p lumbosacral laminectomy (11/17/2017) admitted with right ankle pain and constipation. No edema, no pressure ulcers noted at this time.

## 2017-12-01 NOTE — DIETITIAN INITIAL EVALUATION ADULT. - PERTINENT LABORATORY DATA
Na 144 [135 - 145], K+ 4.3 [3.5 - 5.3], BUN 12 [7 - 23], Cr 0.52 [0.50 - 1.30],  [70 - 99], Ca 9.6 [8.4 - 10.5], HbA1c 8.1%; Fingersticks (11/29-12/1) 128-198

## 2017-12-01 NOTE — PROGRESS NOTE ADULT - PROBLEM SELECTOR PLAN 5
Holding oral hypoglycemics.  Continue sliding scale coverage.  A1C 8.1 earlier this month.
Continue current meds,
Holding oral hypoglycemics.  Continue sliding scale coverage.  A1C 8.1 earlier this month.

## 2017-12-14 ENCOUNTER — APPOINTMENT (OUTPATIENT)
Dept: SPINE | Facility: CLINIC | Age: 62
End: 2017-12-14
Payer: COMMERCIAL

## 2017-12-14 VITALS
SYSTOLIC BLOOD PRESSURE: 130 MMHG | HEIGHT: 62.5 IN | BODY MASS INDEX: 34.45 KG/M2 | WEIGHT: 192 LBS | DIASTOLIC BLOOD PRESSURE: 80 MMHG

## 2017-12-14 PROCEDURE — 99024 POSTOP FOLLOW-UP VISIT: CPT

## 2018-02-15 ENCOUNTER — APPOINTMENT (OUTPATIENT)
Dept: SPINE | Facility: CLINIC | Age: 63
End: 2018-02-15
Payer: COMMERCIAL

## 2018-02-15 VITALS
WEIGHT: 191 LBS | DIASTOLIC BLOOD PRESSURE: 82 MMHG | BODY MASS INDEX: 34.27 KG/M2 | SYSTOLIC BLOOD PRESSURE: 134 MMHG | HEIGHT: 62.5 IN

## 2018-02-15 PROCEDURE — 99024 POSTOP FOLLOW-UP VISIT: CPT

## 2018-04-04 ENCOUNTER — RX RENEWAL (OUTPATIENT)
Age: 63
End: 2018-04-04

## 2018-04-10 ENCOUNTER — OTHER (OUTPATIENT)
Age: 63
End: 2018-04-10

## 2018-04-12 ENCOUNTER — APPOINTMENT (OUTPATIENT)
Dept: SPINE | Facility: CLINIC | Age: 63
End: 2018-04-12
Payer: COMMERCIAL

## 2018-04-26 ENCOUNTER — APPOINTMENT (OUTPATIENT)
Dept: SPINE | Facility: CLINIC | Age: 63
End: 2018-04-26
Payer: COMMERCIAL

## 2018-04-26 VITALS
HEIGHT: 62.5 IN | DIASTOLIC BLOOD PRESSURE: 70 MMHG | BODY MASS INDEX: 34.27 KG/M2 | WEIGHT: 191 LBS | SYSTOLIC BLOOD PRESSURE: 130 MMHG

## 2018-04-26 DIAGNOSIS — M25.559 PAIN IN UNSPECIFIED HIP: ICD-10-CM

## 2018-04-26 DIAGNOSIS — Z98.890 OTHER SPECIFIED POSTPROCEDURAL STATES: ICD-10-CM

## 2018-04-26 PROCEDURE — 99213 OFFICE O/P EST LOW 20 MIN: CPT

## 2018-04-26 RX ORDER — DIAZEPAM 5 MG/1
5 TABLET ORAL
Qty: 2 | Refills: 0 | Status: DISCONTINUED | COMMUNITY
Start: 2018-04-04 | End: 2018-04-26

## 2018-04-26 RX ORDER — MELOXICAM 15 MG/1
15 TABLET ORAL
Refills: 0 | Status: ACTIVE | COMMUNITY

## 2018-04-26 RX ORDER — ACETAMINOPHEN 325 MG/1
TABLET, FILM COATED ORAL
Refills: 0 | Status: ACTIVE | COMMUNITY

## 2018-05-14 ENCOUNTER — APPOINTMENT (OUTPATIENT)
Dept: ORTHOPEDIC SURGERY | Facility: CLINIC | Age: 63
End: 2018-05-14
Payer: COMMERCIAL

## 2018-05-14 VITALS
BODY MASS INDEX: 35.15 KG/M2 | DIASTOLIC BLOOD PRESSURE: 79 MMHG | HEART RATE: 59 BPM | WEIGHT: 191 LBS | SYSTOLIC BLOOD PRESSURE: 130 MMHG | HEIGHT: 62 IN

## 2018-05-14 DIAGNOSIS — Z78.9 OTHER SPECIFIED HEALTH STATUS: ICD-10-CM

## 2018-05-14 DIAGNOSIS — Z87.09 PERSONAL HISTORY OF OTHER DISEASES OF THE RESPIRATORY SYSTEM: ICD-10-CM

## 2018-05-14 PROCEDURE — 99204 OFFICE O/P NEW MOD 45 MIN: CPT | Mod: 25

## 2018-05-14 PROCEDURE — 73502 X-RAY EXAM HIP UNI 2-3 VIEWS: CPT | Mod: LT

## 2018-05-14 PROCEDURE — 20611 DRAIN/INJ JOINT/BURSA W/US: CPT | Mod: LT

## 2018-05-14 RX ORDER — DICLOFENAC SODIUM 50 MG/1
50 TABLET, DELAYED RELEASE ORAL
Qty: 60 | Refills: 0 | Status: ACTIVE | COMMUNITY
Start: 2018-05-14 | End: 1900-01-01

## 2018-05-14 RX ORDER — DICLOFENAC SODIUM 20 MG/G
2 SOLUTION TOPICAL
Qty: 1 | Refills: 0 | Status: ACTIVE | COMMUNITY
Start: 2018-05-14 | End: 1900-01-01

## 2018-06-18 ENCOUNTER — APPOINTMENT (OUTPATIENT)
Dept: ORTHOPEDIC SURGERY | Facility: CLINIC | Age: 63
End: 2018-06-18
Payer: COMMERCIAL

## 2018-06-18 DIAGNOSIS — M70.62 TROCHANTERIC BURSITIS, LEFT HIP: ICD-10-CM

## 2018-06-18 PROCEDURE — 99214 OFFICE O/P EST MOD 30 MIN: CPT

## 2018-08-30 ENCOUNTER — APPOINTMENT (OUTPATIENT)
Dept: SPINE | Facility: CLINIC | Age: 63
End: 2018-08-30
Payer: COMMERCIAL

## 2018-08-30 VITALS
BODY MASS INDEX: 35.15 KG/M2 | SYSTOLIC BLOOD PRESSURE: 130 MMHG | WEIGHT: 191 LBS | HEIGHT: 62 IN | DIASTOLIC BLOOD PRESSURE: 82 MMHG

## 2018-08-30 DIAGNOSIS — M48.00 SPINAL STENOSIS, SITE UNSPECIFIED: ICD-10-CM

## 2018-08-30 PROCEDURE — 99213 OFFICE O/P EST LOW 20 MIN: CPT

## 2018-09-17 ENCOUNTER — RESULT REVIEW (OUTPATIENT)
Age: 63
End: 2018-09-17

## 2018-11-29 NOTE — ED ADULT NURSE NOTE - NS ED STAT RN HAND OFF 2
He has actinic keratoses on his scalp for which he sees a dermatologist fairly regularly.  He reports that he is due and would like a referral.   Additional handoff

## 2020-04-09 PROBLEM — M25.559 HIP PAIN: Status: ACTIVE | Noted: 2018-02-15

## 2021-01-28 NOTE — H&P PST ADULT - SYMPTOMS
"[facility]  Neurosurgery  Progress Note  04/18/19  Subjective:       History of Present Illness: HCP s/p  shunt placement 04/03/19 now with increasing HCP and ye fontanelle.    Patient Active Problem List   Diagnosis    Prematurity, 1,000-1,249 grams, 27-28 completed weeks    IVH (intraventricular hemorrhage)    Hydrocephalus    Apnea of prematurity    Anemia of prematurity    Chronic respiratory insufficiency    Meningitis due to pseudomonas    ASD (atrial septal defect), ostium secundum         Post-Op Info:  Procedure(s) (LRB):  INSERTION, SHUNT, VENTRICULOPERITONEAL (Left)   15 Days Post-Op      Medications:  Continuous Infusions:  Scheduled Meds:   pediatric multivit no.80-iron  0.5 mL Oral Daily     PRN Meds:heparin, porcine (PF), white petrolatum     Review of Systems  Objective:     Weight: 2.745 kg (6 lb 0.8 oz)  Body mass index is 15.2 kg/m².  Vital Signs (Most Recent):  Temp: 97.8 °F (36.6 °C) (04/18/19 0800)  Pulse: 141 (04/18/19 1121)  Resp: 60 (04/18/19 1121)  BP: (!) 113/73 (04/18/19 0815)  SpO2: 93 % (04/18/19 1300) Vital Signs (24h Range):  Temp:  [97.8 °F (36.6 °C)-98.5 °F (36.9 °C)] 97.8 °F (36.6 °C)  Pulse:  [130-183] 141  Resp:  [46-84] 60  SpO2:  [87 %-97 %] 93 %  BP: ()/(40-73) 113/73     Date 04/18/19 0700 - 04/19/19 0659   Shift 3636-6654 6751-6259 4158-6225 24 Hour Total   INTAKE   P.O. 41   41   I.V.(mL/kg) 2(0.7)   2(0.7)   NG/GT 61   61   Shift Total(mL/kg) 104(37.9)   104(37.9)   OUTPUT   Urine(mL/kg/hr) 68   68   Shift Total(mL/kg) 68(24.8)   68(24.8)   Weight (kg) 2.7 2.7 2.7 2.7       Head Circumference: 33.5 cm (13.19")      Oxygen Concentration (%):  [23-27] 27         NG/OG Tube 04/11/19 1400 nasogastric 5 Fr. Left nostril (Active)   Placement Check placement verified by distal tube length measurement 2019 11:00 AM   Distal Tube Length (cm) 19 2019 11:00 AM   Tolerance no signs/symptoms of discomfort 2019 11:00 AM "
  Securement secured to cheek 2019 11:00 AM   Clamp Status/Tolerance unclamped 2019  5:00 PM   Insertion Site Appearance no redness, warmth, tenderness, skin breakdown, drainage 2019 11:00 AM   Feeding Method bolus by pump 2019 11:00 AM   Formula Name neo24 2019  5:00 PM   Intake (mL) - Formula Tube Feeding 11 2019 11:00 AM   Length Of Feeding (Min) 10 2019 11:00 AM       Neurosurgery Physical Exam  Baby ROBBY  AF full and slightly tense  Incisions- CDI           HC   19-33.5  04-33.4  04-33  04-32.1  19-31.5  04/10/19-31.5  04-31.5  04-31.5  04-31.5  04  03-30.5  19-30.5  19-19-30  03-29.7  03-29.7  19-29.5  19- 29.5  19-29.5  19-29.2      Significant Labs:  No results for input(s): GLU, NA, K, CL, CO2, BUN, CREATININE, CALCIUM, MG in the last 48 hours.  No results for input(s): WBC, HGB, HCT, PLT in the last 48 hours.  No results for input(s): LABPT, INR, APTT in the last 48 hours.  Microbiology Results (last 7 days)     ** No results found for the last 168 hours. **            Assessment/Plan:     Active Diagnoses:    Diagnosis Date Noted POA    PRINCIPAL PROBLEM:  Prematurity, 1,000-1,249 grams, 27-28 completed weeks [P07.14] 2019 Yes    ASD (atrial septal defect), ostium secundum [Q21.1] 2019 Not Applicable    Chronic respiratory insufficiency [R06.89] 2019 No    Meningitis due to pseudomonas [G00.8] 2019 No    Anemia of prematurity [P61.2] 2019 No    Apnea of prematurity [P28.4]  No    Hydrocephalus [G91.9]  No    IVH (intraventricular hemorrhage) [I61.5]  No      Problems Resolved During this Admission:    Diagnosis Date Noted Date Resolved POA    Chronic lung disease of prematurity [P27.9]  2019 Unknown    Acute respiratory distress in  with surfactant disorder [P22.0] 2019/2019 Yes    Need 
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 Garcia     Will review with Dr. Jose aTn [id number]  Neurosurgery  [facility]  
none
Yes

## 2022-02-23 NOTE — PHYSICAL THERAPY INITIAL EVALUATION ADULT - BED MOBILITY LIMITATIONS, REHAB EVAL
Physical Therapy    Facility/Department: [address] NURSING  Initial Assessment    NAME: Hanh Dugan  : 1976  MRN: [id number]    Date of Service: 2022    Discharge Recommendations: Hanh Dugan scored a 8/24 on the AM-PAC short mobility form. Current research shows that an AM-PAC score of 17 or less is typically not associated with a discharge to the patient's home setting. Based on the patient's AM-PAC score and their current functional mobility deficits, it is recommended that the patient have 3-5 sessions per week of Physical Therapy at d/c to increase the patient's independence. Please see assessment section for further patient specific details. If patient discharges prior to next session this note will serve as a discharge summary. Please see below for the latest assessment towards goals. PT Equipment Recommendations  Equipment Needed: No (defer to next level of care)    Assessment   Body structures, Functions, Activity limitations: Decreased functional mobility ; Decreased strength;Decreased safe awareness;Decreased cognition;Decreased endurance;Decreased balance;Decreased vision/visual deficit; Decreased posture  Assessment: Pt presents from home where she lives with her  who assists with all ADLs and functional mobility. Pt  reports she has had \"hundreds\" of falls over recent months and fears he is unable to take care of her anymore. At this time, pt require max A x2 for bed mobility, CGA-max A for sitting EOB and is unable to safely complete transfers this date due to poor command following and decreased alertness.  Pt would continue to benefit from skilled PT to prevent future falls, decrease caretaker burden and return to PLOF  Treatment Diagnosis: decreased functional mobility, decreased cognition  Prognosis: Good  Decision Making: Medium Complexity  Clinical Presentation: evolving  PT Education: Goals;PT Role;Plan of Care  Patient Education: Pt requires reinforcement  Barriers to Learning: cognition  REQUIRES PT FOLLOW UP: Yes  Activity Tolerance  Activity Tolerance: Patient limited by fatigue;Patient limited by cognitive status  Activity Tolerance: Pt very lethargic and confused this date, unable to follow commands       Patient Diagnosis(es): The primary encounter diagnosis was Overuse of medication. Diagnoses of Chronic prescription benzodiazepine use, Generalized weakness, Stage 5 chronic kidney disease on chronic dialysis (HCC), Hypoglycemia due to type 2 diabetes mellitus ([address] and Generalized anxiety disorder with panic attacks were also pertinent to this visit. has a past medical history of Blind in both eyes, Cerebral artery occlusion with cerebral infarction ([facility] [address] CHF (congestive heart failure) ([facility] [address] Chronic kidney disease, Depression, Diabetes mellitus out of control ([address] Diabetes mellitus, type II ([facility] [address] Diabetic neuropathy associated with type 2 diabetes mellitus ([facility] [address] Generalized headaches, Hypertension, Infertility, Insomnia, Migraine headache, Mixed hyperlipidemia, Otitis media, Pelvic abscess in female, Pneumonia, Stroke [facility]), and Stroke ([facility] [address] has a past surgical history that includes Cervix surgery; eye surgery; Foot surgery (Right); Foot surgery (Bilateral); Foot surgery (Left); IR TUNNELED CVC PLACE WO SQ PORT/PUMP > 5 YEARS (9/7/2021); and Dialysis fistula creation (Right, 2/10/2022). Restrictions  Restrictions/Precautions  Restrictions/Precautions: Fall Risk (high fall risk)  Required Braces or Orthoses?: No  Position Activity Restriction  Other position/activity restrictions: This is a  54 y.o. female who presents today with fatigue, patient is extremley sleepy, she states that her blood sugar has been running low, she started to feel weak around 4am. Supposedly she had a stroke in August of 2021, but she has numerous bruising to her bilateral legs, she states she has been falling a lot.  She is denying and alcohol or drug use.  However, she does admit to taking her benzodiazepine but she is on Xanax. It patient remembers having breakfast this morning and has been tired ever since. She states she is not been eating very much however she still giving herself insulin. She denies any nausea vomiting or diarrhea. No cough, congestion, fever or chills. No chest pain, chest pain or shortness of breath. She states she just feels tired, no additional complaints, no additional aggravating relieving factors. The patient presents awake, alert however sleepy  Vision/Hearing  Vision: Impaired (chart reports pt is blind however pt reports she can see therapist and is able to accurately tell how many fingers therapist is holding up 3/3 times)  Hearing: Exceptions to [facility] (difficult to assess due to delayed responses to therapists)     Subjective  General  Chart Reviewed: Yes  Patient assessed for rehabilitation services?: Yes  Response To Previous Treatment: Not applicable  Family / Caregiver Present: No (spouse called in order to obtain prior level of function/social hx)  Follows Commands: Impaired  General Comment  Comments: Pt supine in bed upon arrival. Brief soiled, agreed to clean up with therapy  Subjective  Subjective: Pt reports no pain at this time, pt is very lethargic this date  Pain Screening  Patient Currently in Pain: No          Orientation  Orientation  Overall Orientation Status: Impaired  Orientation Level: Oriented to person;Disoriented to place; Disoriented to time;Disoriented to situation  Social/Functional History  Social/Functional History  Lives With: Spouse  Type of Home:  (condo)  Home Layout: One level  Home Access: Stairs to enter without rails  Entrance Stairs - Number of Steps: 1 inch lip to get into sliding door  Bathroom Shower/Tub: Shower chair with back,Walk-in shower  Bathroom Toilet: Standard  Bathroom Equipment: Grab bars in shower  Home Equipment: Rolling walker  ADL Assistance: Needs assistance (spouse does IADLs)  Homemaking Responsibilities: No  Ambulation Assistance: Needs assistance (spouse assists with gait belt and RW, however, lately has not been able to safely complete)  Transfer Assistance: Needs assistance (needs assistance for all transfers)  Active : No  Patient's  Info:  drives  Additional Comments: Pt is a poor historian. Information collected from spouse via phone. Pt's spouse reports that pt has been declining over the past 6 months. Pt 6 months ago was falling 1-3 times a day, however, prior to this hospitalization pt was falling 10x per day. Spouse states pt has poor safety awareness and that he could not leave to get pt water without her trying to get up and falling.   Cognition   Cognition  Overall Cognitive Status: Exceptions  Arousal/Alertness: Delayed responses to stimuli;Inconsistent responses to stimuli  Following Commands: Inconsistently follows commands  Attention Span: Difficulty attending to directions  Memory: Decreased short term memory;Decreased recall of recent events  Safety Judgement: Decreased awareness of need for assistance;Decreased awareness of need for safety  Problem Solving: Assistance required to identify errors made;Assistance required to generate solutions;Assistance required to implement solutions;Decreased awareness of errors;Assistance required to correct errors made  Insights: Decreased awareness of deficits  Initiation: Requires cues for all  Sequencing: Requires cues for all  Cognition Comment: increased time for processing, delayed and inconsistent responses, decreased command following throughout    Objective          AROM RLE (degrees)  RLE AROM: WFL  AROM LLE (degrees)  LLE AROM : WFL  Strength RLE  Comment: unable to formally assess due to cognitive status  Strength LLE  Comment: unable to formally assess due to cognitive status  Tone RLE  RLE Tone:  (some LE tightness)  Tone LLE  LLE Tone:  (some LE tightness)  Sensation  Overall Sensation able to complete transfers with min A x1  Short term goal 3: Pt will be able to ambulate 10 ft with min A x1       Therapy Time   Individual Concurrent Group Co-treatment   Time In 1430         Time Out 1508         Minutes 38              Timed Code Treatment Minutes:   23    Total Treatment Minutes:  [address] [place] [address], [address] [facility], [address] [address] decreased ability to use legs for bridging/pushing/impaired ability to control trunk for mobility

## 2022-03-11 NOTE — H&P PST ADULT - NSANTHBPHIGHRD_ENT_A_CORE
Admission assessment complete, VSS, all admission documents gone over, pt is A&O X4, skin is clear of any wounds, pt able to answer all questions and follow commands, significant other at bedside at this time, pt on room air and O2 sats maintaining in the mid 90's, NSR on the monitor, all pt belongings accounted for with patient, all questions asked and answered, pt and significant other both verbalized understanding, call light withn reach, bed in lowest position and brakes locked, as pt ambulates independently, with no assistive devices. Yes

## 2022-12-22 NOTE — PHYSICAL THERAPY INITIAL EVALUATION ADULT - IMPAIRMENTS CONTRIBUTING TO GAIT DEVIATIONS, PT EVAL
impaired balance/decreased ROM/decreased strength/pain Enbrel Pregnancy And Lactation Text: This medication is Pregnancy Category B and is considered safe during pregnancy. It is unknown if this medication is excreted in breast milk.

## 2023-11-10 NOTE — H&P PST ADULT - NEUROLOGICAL
I have signed for the following orders AND/OR meds.  Please call the patient and ask the patient to schedule the testing AND/OR inform about any medications that were sent. Medications have been sent to pharmacy listed below      Orders Placed This Encounter   Procedures    POCT TB Skin Test              [facility] - [place] - [address]  [address] [facility] [address]  [place] [address]  Phone: [phone] Fax: [phone]    [facility] [place] - [place] - [address] [address]  Phone: [phone] Fax: [phone]     negative detailed exam

## 2025-01-03 NOTE — PROVIDER CONTACT NOTE (MEDICATION) - ACTION/TREATMENT ORDERED:
If using menthol containing products, such as Ricola cough drops, recommendation would be to have INR monitored more closely. Can forward to [facility] to see when they would like next INR to be completed based upon use.    reschedule AM BP meds